# Patient Record
Sex: FEMALE | Race: WHITE | NOT HISPANIC OR LATINO | Employment: OTHER | URBAN - METROPOLITAN AREA
[De-identification: names, ages, dates, MRNs, and addresses within clinical notes are randomized per-mention and may not be internally consistent; named-entity substitution may affect disease eponyms.]

---

## 2017-05-09 DIAGNOSIS — R03.0 ELEVATED BLOOD PRESSURE READING WITHOUT DIAGNOSIS OF HYPERTENSION: Primary | ICD-10-CM

## 2017-05-09 LAB
CHOLESTEROL (EXTERNAL): 177 MG/DL (ref 100–199)
CREATININE (EXTERNAL): 0.74 MG/DL (ref 0.57–1)
GFR ESTIMATED (EXTERNAL): 80 ML/MIN/1.73
GFR ESTIMATED (IF AFRICAN AMERICAN) (EXTERNAL): 92 ML/MIN/1.73
GLUCOSE (EXTERNAL): 101 MG/DL (ref 65–99)
HDLC SERPL-MCNC: 52 MG/DL
LDL CHOLESTEROL CALCULATED (EXTERNAL): 109 MG/DL (ref 0–99)
POTASSIUM (EXTERNAL): 4.6 MMOL/L (ref 3.5–5.2)
TRIGLYCERIDES (EXTERNAL): 78 MG/DL (ref 0–149)
TSH SERPL-ACNC: 5.89 ULU/ML (ref 0.45–4.5)

## 2017-06-20 ENCOUNTER — HOSPITAL ENCOUNTER (OUTPATIENT)
Dept: CARDIOLOGY | Facility: CLINIC | Age: 77
Discharge: HOME | End: 2017-06-20
Attending: FAMILY MEDICINE | Admitting: FAMILY MEDICINE
Payer: MEDICARE

## 2017-06-20 DIAGNOSIS — R03.0 ELEVATED BLOOD PRESSURE READING WITHOUT DIAGNOSIS OF HYPERTENSION: ICD-10-CM

## 2017-06-20 PROCEDURE — 93788 AMBL BP MNTR W/SW A/R: CPT | Performed by: FAMILY MEDICINE

## 2017-06-20 PROCEDURE — 93786 AMBL BP MNTR W/SW REC ONLY: CPT | Performed by: FAMILY MEDICINE

## 2017-06-20 PROCEDURE — 93790 AMBL BP MNTR W/SW I&R: CPT | Performed by: INTERNAL MEDICINE

## 2017-06-26 ENCOUNTER — HOSPITAL ENCOUNTER (OUTPATIENT)
Dept: MAMMOGRAPHY | Facility: CLINIC | Age: 77
Discharge: HOME | End: 2017-06-26
Attending: FAMILY MEDICINE | Admitting: FAMILY MEDICINE
Payer: MEDICARE

## 2017-06-26 DIAGNOSIS — Z12.31 ENCOUNTER FOR SCREENING MAMMOGRAM FOR HIGH-RISK PATIENT: ICD-10-CM

## 2017-06-26 PROCEDURE — 77063 BREAST TOMOSYNTHESIS BI: CPT

## 2017-08-13 ENCOUNTER — OFFICE VISIT (OUTPATIENT)
Dept: URGENT CARE | Facility: URGENT CARE | Age: 77
End: 2017-08-13
Payer: COMMERCIAL

## 2017-08-13 VITALS
WEIGHT: 160 LBS | HEART RATE: 57 BPM | DIASTOLIC BLOOD PRESSURE: 95 MMHG | SYSTOLIC BLOOD PRESSURE: 159 MMHG | TEMPERATURE: 97.6 F

## 2017-08-13 DIAGNOSIS — S61.213A LACERATION OF LEFT MIDDLE FINGER, FOREIGN BODY PRESENCE UNSPECIFIED, NAIL DAMAGE STATUS UNSPECIFIED, INITIAL ENCOUNTER: Primary | ICD-10-CM

## 2017-08-13 PROCEDURE — 12001 RPR S/N/AX/GEN/TRNK 2.5CM/<: CPT | Performed by: FAMILY MEDICINE

## 2017-08-13 RX ORDER — AMLODIPINE BESYLATE 5 MG/1
5 TABLET ORAL DAILY
COMMUNITY
End: 2021-08-04

## 2017-08-13 RX ORDER — LEVOTHYROXINE SODIUM 88 UG/1
88 TABLET ORAL DAILY
COMMUNITY
End: 2021-08-04

## 2017-08-13 NOTE — MR AVS SNAPSHOT
"              After Visit Summary   2017    Marah Mccormick    MRN: [id number]           Patient Information     Date Of Birth          1940        Visit Information        Provider Department      2017 4:50 PM Elias Garcia,  [facility]        Today's Diagnoses     Laceration of left middle finger, foreign body presence unspecified, nail damage status unspecified, initial encounter    -  1       Follow-ups after your visit        Who to contact     If you have questions or need follow up information about today's clinic visit or your schedule please contact [facility] directly at [phone].  Normal or non-critical lab and imaging results will be communicated to you by MyChart, letter or phone within 4 business days after the clinic has received the results. If you do not hear from us within 7 days, please contact the clinic through MyChart or phone. If you have a critical or abnormal lab result, we will notify you by phone as soon as possible.  Submit refill requests through MOVE Guides or call your pharmacy and they will forward the refill request to us. Please allow 3 business days for your refill to be completed.          Additional Information About Your Visit        MyChart Information     MOVE Guides lets you send messages to your doctor, view your test results, renew your prescriptions, schedule appointments and more. To sign up, go to [email or web address] . Click on \"Log in\" on the left side of the screen, which will take you to the Welcome page. Then click on \"Sign up Now\" on the right side of the page.     You will be asked to enter the access code listed below, as well as some personal information. Please follow the directions to create your username and password.     Your access code is: [email or web address]  Expires: 2017  5:25 PM     Your access code will  in 90 days. If you need help or a new code, please call your " [facility] or [phone].        Care EveryWhere ID     This is your Care EveryWhere ID. This could be used by other organizations to access your [place] medical records  [id number]        Your Vitals Were     Pulse Temperature                57 97.6  F (36.4  C) (Oral)           Blood Pressure from Last 3 Encounters:   08/13/17 (!) 159/95    Weight from Last 3 Encounters:   08/13/17 160 lb (72.6 kg)              We Performed the Following     REPAIR SUPERFICIAL, WOUND BODY < =2.5CM        Primary Care Provider Office Phone # Fax #    Sudha Eleni Austin [phone]       [facility] [address]  [place] [address]        Equal Access to Services     RIMA PERALTA : Hadii aad ku hadasho Soomaali, waaxda luqadaha, qaybta kaalmada adeegyada, waxay idiin haybaldon anshu sifuentes . So [facility] [phone].    ATENCIÓN: Si habla español, tiene a jha disposición servicios gratuitos de asistencia lingüística. Llame al [phone].    We comply with applicable federal civil rights laws and [place] laws. We do not discriminate on the basis of race, color, national origin, age, disability sex, sexual orientation or gender identity.            Thank you!     Thank you for choosing [facility]  for your care. Our goal is always to provide you with excellent care. Hearing back from our patients is one way we can continue to improve our services. Please take a few minutes to complete the written survey that you may receive in the mail after your visit with us. Thank you!             Your Updated Medication List - Protect others around you: Learn how to safely use, store and throw away your medicines at www.disposemymeds.org.          This list is accurate as of: 8/13/17  5:25 PM.  Always use your most recent med list.                   Brand Name Dispense Instructions for use Diagnosis    amLODIPine 5 MG tablet    NORVASC     Take 5 mg by mouth daily         levothyroxine 88 MCG tablet    SYNTHROID/LEVOTHROID     Take 88 mcg by mouth daily

## 2017-08-13 NOTE — NURSING NOTE
Chief Complaint   Patient presents with     Laceration     laceration of left 3rd finger from knife. Pt states her last Td was maybe 7yrs ago.       Initial BP (!) 159/95  Pulse 57  Temp 97.6  F (36.4  C) (Oral)  Wt 160 lb (72.6 kg) There is no height or weight on file to calculate BMI.  Medication Reconciliation: complete

## 2017-08-13 NOTE — PROGRESS NOTES
SUBJECTIVE: @[email or web address]@.ident who presents to the clinic with a laceration on the finger sustained 2 hours(s) ago.    This is a accidental injury.    Mechanism of injury: knife.  Associated symptoms: Denies numbness, weakness, or loss of function  Last tetanus booster within 10 years: yes    No past medical history on file.  No Known Allergies  Social History   Substance Use Topics     Smoking status: Never Smoker     Smokeless tobacco: Never Used     Alcohol use Not on file       ROS:  Neuro: good distal sensation  Motor: normal rom and strenght  Hem: capillary refill < 2 sec    EXAM: The patient appears today in alert,no apparent distress distressVITALS:   BP (!) 159/95  Pulse 57  Temp 97.6  F (36.4  C) (Oral)  Wt 160 lb (72.6 kg)  Size of laceration: 2 centimeters  Characteristics of the laceration: clean and straight  Tendon function intact: yes  Sensation to light touch intact: yes  Pulses/Capillary refill intact: yes      ICD-10-CM    1. Laceration of left middle finger, foreign body presence unspecified, nail damage status unspecified, initial encounter S61.213A REPAIR SUPERFICIAL, WOUND BODY < =2.5CM       Procedure Note:Wound injected with 1 cc's of Lidocaine 1% plain  Good anesthesia was obtainedPrepped and draped in the usual sterile fashion  Wound cleaned with sterile water  Wound soakedLaceration was closed using 3 5-0 nylon interrupted sutures  After care instructions:Keep wound clean   Sutures out in 10 days  Signs of infection discussed today

## 2017-08-23 ENCOUNTER — OFFICE VISIT (OUTPATIENT)
Dept: URGENT CARE | Facility: URGENT CARE | Age: 77
End: 2017-08-23
Payer: COMMERCIAL

## 2017-08-23 DIAGNOSIS — Z48.89 SUTURE CHECK: Primary | ICD-10-CM

## 2017-08-23 PROCEDURE — 99024 POSTOP FOLLOW-UP VISIT: CPT

## 2017-08-23 NOTE — NURSING NOTE
Patient was advise to return in 4 days from today per Dr. Emi Arriaza due to laceration on the left hand middle finger distal joint. Smiley Resendez MA

## 2017-08-23 NOTE — PROGRESS NOTES
Sutures over DIP of finger, 3 sutures.  In for 10 days, advised keeping in for 14 days    Emi DIMAS, PAJersonC

## 2017-08-23 NOTE — MR AVS SNAPSHOT
"              After Visit Summary   2017    Marah Mccormick    MRN: [id number]           Patient Information     Date Of Birth          1940        Visit Information        Provider Department      2017 11:30 AM Provider, Pradeep Lauren MD [facility]        Today's Diagnoses     Suture check    -  1       Follow-ups after your visit        Who to contact     If you have questions or need follow up information about today's clinic visit or your schedule please contact [facility] directly at [phone].  Normal or non-critical lab and imaging results will be communicated to you by DocLandinghart, letter or phone within 4 business days after the clinic has received the results. If you do not hear from us within 7 days, please contact the clinic through AppFogt or phone. If you have a critical or abnormal lab result, we will notify you by phone as soon as possible.  Submit refill requests through Zazoom or call your pharmacy and they will forward the refill request to us. Please allow 3 business days for your refill to be completed.          Additional Information About Your Visit        MyChart Information     Zazoom lets you send messages to your doctor, view your test results, renew your prescriptions, schedule appointments and more. To sign up, go to [email or web address] . Click on \"Log in\" on the left side of the screen, which will take you to the Welcome page. Then click on \"Sign up Now\" on the right side of the page.     You will be asked to enter the access code listed below, as well as some personal information. Please follow the directions to create your username and password.     Your access code is: [email or web address]  Expires: 2017  5:25 PM     Your access code will  in 90 days. If you need help or a new code, please call your [place] clinic or [phone].        Care EveryWhere ID     This is your Care EveryWhere ID. This " could be used by other organizations to access your [place] medical records  [id number]         Blood Pressure from Last 3 Encounters:   08/13/17 (!) 159/95    Weight from Last 3 Encounters:   08/13/17 160 lb (72.6 kg)              Today, you had the following     No orders found for display       Primary Care Provider Office Phone # Fax #    Sudha Eleni Austin [phone]       [facility] [address]  [facility] [address]        Equal Access to Services     [facility]: Hadii aad ku hadasho Soomaali, waaxda luqadaha, qaybta kaalmada adeegyada, waxay idiin hayaan adeeg kharash lareynold . So [facility] [phone].    ATENCIÓN: Si habla español, tiene a jha disposición servicios gratuitos de asistencia lingüística. [facility] [phone].    We comply with applicable federal civil rights laws and [place] laws. We do not discriminate on the basis of race, color, national origin, age, disability sex, sexual orientation or gender identity.            Thank you!     Thank you for choosing [place] [facility]  for your care. Our goal is always to provide you with excellent care. Hearing back from our patients is one way we can continue to improve our services. Please take a few minutes to complete the written survey that you may receive in the mail after your visit with us. Thank you!             Your Updated Medication List - Protect others around you: Learn how to safely use, store and throw away your medicines at www.disposemymeds.org.          This list is accurate as of: 8/23/17  4:33 PM.  Always use your most recent med list.                   Brand Name Dispense Instructions for use Diagnosis    amLODIPine 5 MG tablet    NORVASC     Take 5 mg by mouth daily        levothyroxine 88 MCG tablet    SYNTHROID/LEVOTHROID     Take 88 mcg by mouth daily

## 2017-08-26 ENCOUNTER — OFFICE VISIT (OUTPATIENT)
Dept: URGENT CARE | Facility: URGENT CARE | Age: 77
End: 2017-08-26
Payer: COMMERCIAL

## 2017-08-26 VITALS — TEMPERATURE: 97.5 F

## 2017-08-26 DIAGNOSIS — Z48.02 ENCOUNTER FOR REMOVAL OF SUTURES: Primary | ICD-10-CM

## 2017-08-26 PROCEDURE — 99207 ZZC NO CHARGE NURSE ONLY: CPT

## 2017-08-26 NOTE — NURSING NOTE
Chief Complaint   Patient presents with     Suture Removal       Initial Temp 97.5  F (36.4  C) (Oral) There is no height or weight on file to calculate BMI.  Medication Reconciliation: unable or not appropriate to perform   Marah presents to the clinic today for  removal of sutures.  The patient has had the sutures in place for 13 days.    There has been no history of infection or drainage.    O: 3 sutures are seen located on the lt index finger.  The wound is healing well with no signs of infection.    Tetanus status is up to date.    A: Suture removal.    P:  All sutures were easily removed today.  Routine wound care discussed.  The patient will follow up as needed.  Order to remove per Gokul Pham LPN

## 2017-08-26 NOTE — MR AVS SNAPSHOT
"              After Visit Summary   2017    Marah Mccormick    MRN: [id number]           Patient Information     Date Of Birth          1940        Visit Information        Provider Department      2017 9:20 AM Provider, Pradeep Lauren MD [facility] [place] [facility]        Today's Diagnoses     Encounter for removal of sutures    -  1       Follow-ups after your visit        Who to contact     If you have questions or need follow up information about today's clinic visit or your schedule please contact [facility] directly at [phone].  Normal or non-critical lab and imaging results will be communicated to you by Navidoghart, letter or phone within 4 business days after the clinic has received the results. If you do not hear from us within 7 days, please contact the clinic through Navidoghart or phone. If you have a critical or abnormal lab result, we will notify you by phone as soon as possible.  Submit refill requests through JustParts or call your pharmacy and they will forward the refill request to us. Please allow 3 business days for your refill to be completed.          Additional Information About Your Visit        MyChart Information     JustParts lets you send messages to your doctor, view your test results, renew your prescriptions, schedule appointments and more. To sign up, go to [email or web address] . Click on \"Log in\" on the left side of the screen, which will take you to the Welcome page. Then click on \"Sign up Now\" on the right side of the page.     You will be asked to enter the access code listed below, as well as some personal information. Please follow the directions to create your username and password.     Your access code is: [email or web address]  Expires: 2017  5:25 PM     Your access code will  in 90 days. If you need help or a new code, please call your [place] clinic or [phone].        Care EveryWhere ID     This is your Care " EveryWhere ID. This could be used by other organizations to access your [place] medical records  [id number]        Your Vitals Were     Temperature                   97.5  F (36.4  C) (Oral)            Blood Pressure from Last 3 Encounters:   08/13/17 (!) 159/95    Weight from Last 3 Encounters:   08/13/17 160 lb (72.6 kg)              Today, you had the following     No orders found for display       Primary Care Provider Office Phone # Fax #    Sudha Eleni Austin [phone]       [facility] [address]  [place] [address]        Equal Access to Services     [facility]: Hadii aad ku hadasho Soomaali, waaxda luqadaha, qaybta kaalmada adeegyada, waxparris mahoney haybaldon adeeddie sifuentes . So [facility] [phone].    ATENCIÓN: Si habla español, tiene a jha disposición servicios gratuitos de asistencia lingüística. Llame al [phone].    We comply with applicable federal civil rights laws and [place] laws. We do not discriminate on the basis of race, color, national origin, age, disability sex, sexual orientation or gender identity.            Thank you!     Thank you for choosing [place] [facility]  for your care. Our goal is always to provide you with excellent care. Hearing back from our patients is one way we can continue to improve our services. Please take a few minutes to complete the written survey that you may receive in the mail after your visit with us. Thank you!             Your Updated Medication List - Protect others around you: Learn how to safely use, store and throw away your medicines at www.disposemymeds.org.          This list is accurate as of: 8/26/17 10:51 AM.  Always use your most recent med list.                   Brand Name Dispense Instructions for use Diagnosis    amLODIPine 5 MG tablet    NORVASC     Take 5 mg by mouth daily        levothyroxine 88 MCG tablet    SYNTHROID/LEVOTHROID     Take 88 mcg by mouth daily

## 2017-09-20 ENCOUNTER — APPOINTMENT (OUTPATIENT)
Dept: VASCULAR SURGERY | Facility: CLINIC | Age: 77
End: 2017-09-20
Payer: COMMERCIAL

## 2017-09-20 PROCEDURE — A6533 GC STOCKING THIGHLNGTH 18-30: HCPCS | Performed by: SURGERY

## 2018-05-10 ENCOUNTER — TRANSFERRED RECORDS (OUTPATIENT)
Dept: HEALTH INFORMATION MANAGEMENT | Facility: CLINIC | Age: 78
End: 2018-05-10

## 2018-05-10 LAB
CHOLESTEROL (EXTERNAL): 188 MG/DL (ref 100–199)
CREATININE (EXTERNAL): 0.78 MG/DL (ref 0.57–1)
GFR ESTIMATED (EXTERNAL): 74 ML/MIN/1.73
GFR ESTIMATED (IF AFRICAN AMERICAN) (EXTERNAL): 85 ML/MIN/1.73
GLUCOSE (EXTERNAL): 94 MG/DL (ref 65–99)
HDLC SERPL-MCNC: 56 MG/DL
LDL CHOLESTEROL CALCULATED (EXTERNAL): 118 MG/DL (ref 0–99)
POTASSIUM (EXTERNAL): 4.5 MMOL/L (ref 3.5–5.2)
TRIGLYCERIDES (EXTERNAL): 72 MG/DL (ref 0–149)
TSH SERPL-ACNC: 3.44 ULU/ML (ref 0.45–4.5)

## 2018-06-27 ENCOUNTER — HOSPITAL ENCOUNTER (OUTPATIENT)
Dept: MAMMOGRAPHY | Facility: CLINIC | Age: 78
Discharge: HOME | End: 2018-06-27
Attending: FAMILY MEDICINE | Admitting: FAMILY MEDICINE
Payer: MEDICARE

## 2018-06-27 DIAGNOSIS — Z12.31 ENCOUNTER FOR SCREENING MAMMOGRAM FOR HIGH-RISK PATIENT: ICD-10-CM

## 2018-06-27 PROCEDURE — 77063 BREAST TOMOSYNTHESIS BI: CPT

## 2018-11-06 ENCOUNTER — TRANSFERRED RECORDS (OUTPATIENT)
Dept: HEALTH INFORMATION MANAGEMENT | Facility: CLINIC | Age: 78
End: 2018-11-06

## 2019-05-01 LAB — PAP SMEAR - HIM PATIENT REPORTED: NEGATIVE

## 2019-07-01 ENCOUNTER — HOSPITAL ENCOUNTER (OUTPATIENT)
Dept: MAMMOGRAPHY | Facility: CLINIC | Age: 79
Discharge: HOME | End: 2019-07-01
Attending: FAMILY MEDICINE | Admitting: FAMILY MEDICINE
Payer: MEDICARE

## 2019-07-01 DIAGNOSIS — Z12.31 VISIT FOR SCREENING MAMMOGRAM: ICD-10-CM

## 2019-07-01 PROCEDURE — 77063 BREAST TOMOSYNTHESIS BI: CPT

## 2019-07-03 ENCOUNTER — TRANSFERRED RECORDS (OUTPATIENT)
Dept: HEALTH INFORMATION MANAGEMENT | Facility: CLINIC | Age: 79
End: 2019-07-03

## 2019-07-03 LAB
CHOLESTEROL (EXTERNAL): 176 MG/DL (ref 100–199)
CREATININE (EXTERNAL): 0.83 MG/DL (ref 0.57–1)
GFR ESTIMATED (EXTERNAL): 68 ML/MIN/1.73
GFR ESTIMATED (IF AFRICAN AMERICAN) (EXTERNAL): 79 ML/MIN/1.73
GLUCOSE (EXTERNAL): 86 MG/DL (ref 65–99)
HDLC SERPL-MCNC: 55 MG/DL
LDL CHOLESTEROL (EXTERNAL): 109 MG/DL (ref 0–99)
POTASSIUM (EXTERNAL): 4.6 MMOL/L (ref 3.5–5.2)
TRIGLYCERIDES (EXTERNAL): 62 MG/DL (ref 0–149)
TSH SERPL-ACNC: 2.95 ULU/ML (ref 0.45–4.5)

## 2019-12-16 ENCOUNTER — TRANSFERRED RECORDS (OUTPATIENT)
Dept: HEALTH INFORMATION MANAGEMENT | Facility: CLINIC | Age: 79
End: 2019-12-16

## 2019-12-16 LAB
CREATININE (EXTERNAL): 0.71 MG/DL (ref 0.57–1)
GFR ESTIMATED (EXTERNAL): 82 ML/MIN/1.73
GFR ESTIMATED (IF AFRICAN AMERICAN) (EXTERNAL): 95 ML/MIN/1.73
GLUCOSE (EXTERNAL): 65 MG/DL (ref 65–99)
POTASSIUM (EXTERNAL): 4.8 MMOL/L (ref 3.5–5.2)

## 2020-07-16 ENCOUNTER — TRANSFERRED RECORDS (OUTPATIENT)
Dept: HEALTH INFORMATION MANAGEMENT | Facility: CLINIC | Age: 80
End: 2020-07-16

## 2020-07-16 LAB
ALT SERPL-CCNC: 17 U/L (ref 6–29)
AST SERPL-CCNC: 17 U/L (ref 10–35)
CHOLESTEROL (EXTERNAL): 182 MG/DL
CREATININE (EXTERNAL): 0.8 MG/DL (ref 0.6–0.93)
GFR ESTIMATED (EXTERNAL): 71 ML/MIN/1.73M2
GFR ESTIMATED (IF AFRICAN AMERICAN) (EXTERNAL): 82 ML/MIN/1.73M2
GLUCOSE (EXTERNAL): 98 MG/DL (ref 65–99)
HBA1C MFR BLD: 5.1 % (ref 4–6)
HDLC SERPL-MCNC: 59 MG/DL
HEP C HIM: NORMAL
LDL CHOLESTEROL (EXTERNAL): 108 MG/DL
NON HDL CHOLESTEROL (EXTERNAL): 123 MG/DL
POTASSIUM (EXTERNAL): 4.2 MMOL/L (ref 3.5–5.3)
TRIGLYCERIDES (EXTERNAL): 65 MG/DL
TSH SERPL-ACNC: 1.75 MIU/L (ref 0.4–4.5)

## 2020-07-21 ENCOUNTER — HOSPITAL ENCOUNTER (OUTPATIENT)
Dept: MAMMOGRAPHY | Facility: CLINIC | Age: 80
Discharge: HOME | End: 2020-07-21
Attending: FAMILY MEDICINE | Admitting: FAMILY MEDICINE
Payer: MEDICARE

## 2020-07-21 DIAGNOSIS — Z12.31 VISIT FOR SCREENING MAMMOGRAM: ICD-10-CM

## 2020-07-21 PROCEDURE — 77067 SCR MAMMO BI INCL CAD: CPT

## 2020-09-08 ENCOUNTER — TRANSFERRED RECORDS (OUTPATIENT)
Dept: HEALTH INFORMATION MANAGEMENT | Facility: CLINIC | Age: 80
End: 2020-09-08

## 2020-09-08 LAB
ALT SERPL-CCNC: 21 U/L (ref 6–29)
AST SERPL-CCNC: 17 U/L (ref 10–35)
CHOLESTEROL (EXTERNAL): 132 MG/DL
HDLC SERPL-MCNC: 58 MG/DL
LDL CHOLESTEROL (EXTERNAL): 61 MG/DL
NON HDL CHOLESTEROL (EXTERNAL): 74 MG/DL
TRIGLYCERIDES (EXTERNAL): 47 MG/DL

## 2021-02-03 ENCOUNTER — IMMUNIZATION (OUTPATIENT)
Dept: NURSING | Facility: CLINIC | Age: 81
End: 2021-02-03
Payer: MEDICARE

## 2021-02-03 PROCEDURE — 91300 PR COVID VAC PFIZER DIL RECON 30 MCG/0.3 ML IM: CPT

## 2021-02-03 PROCEDURE — 0001A PR COVID VAC PFIZER DIL RECON 30 MCG/0.3 ML IM: CPT

## 2021-02-06 ENCOUNTER — HEALTH MAINTENANCE LETTER (OUTPATIENT)
Age: 81
End: 2021-02-06

## 2021-02-24 ENCOUNTER — IMMUNIZATION (OUTPATIENT)
Dept: NURSING | Facility: CLINIC | Age: 81
End: 2021-02-24
Attending: FAMILY MEDICINE
Payer: MEDICARE

## 2021-02-24 PROCEDURE — 91300 PR COVID VAC PFIZER DIL RECON 30 MCG/0.3 ML IM: CPT

## 2021-02-24 PROCEDURE — 0002A PR COVID VAC PFIZER DIL RECON 30 MCG/0.3 ML IM: CPT

## 2021-05-05 ENCOUNTER — TELEPHONE (OUTPATIENT)
Dept: FAMILY MEDICINE | Facility: CLINIC | Age: 81
End: 2021-05-05

## 2021-05-05 NOTE — TELEPHONE ENCOUNTER
Reason for Call:  Other appointment    Detailed comments: Pt would like to establish care with Dr High. Her son and SO both see Dr High.    Phone Number Patient can be reached at: Home number on file [phone] (home)    Best Time: any    Can we leave a detailed message on this number? YES    Call taken on 5/5/2021 at 10:39 AM by Arturo Wilson

## 2021-07-26 ENCOUNTER — HOSPITAL ENCOUNTER (OUTPATIENT)
Dept: MAMMOGRAPHY | Facility: CLINIC | Age: 81
Discharge: HOME | End: 2021-07-26
Attending: INTERNAL MEDICINE | Admitting: INTERNAL MEDICINE
Payer: MEDICARE

## 2021-07-26 DIAGNOSIS — Z12.31 VISIT FOR SCREENING MAMMOGRAM: ICD-10-CM

## 2021-07-26 PROCEDURE — 77063 BREAST TOMOSYNTHESIS BI: CPT

## 2021-08-04 ENCOUNTER — OFFICE VISIT (OUTPATIENT)
Dept: FAMILY MEDICINE | Facility: CLINIC | Age: 81
End: 2021-08-04
Payer: MEDICARE

## 2021-08-04 VITALS
HEART RATE: 59 BPM | HEIGHT: 65 IN | SYSTOLIC BLOOD PRESSURE: 123 MMHG | BODY MASS INDEX: 27.66 KG/M2 | WEIGHT: 166 LBS | DIASTOLIC BLOOD PRESSURE: 81 MMHG | TEMPERATURE: 96.8 F | OXYGEN SATURATION: 99 %

## 2021-08-04 DIAGNOSIS — Z13.220 SCREENING FOR HYPERLIPIDEMIA: ICD-10-CM

## 2021-08-04 DIAGNOSIS — E78.5 HYPERLIPIDEMIA LDL GOAL <100: ICD-10-CM

## 2021-08-04 DIAGNOSIS — Z00.00 ENCOUNTER FOR MEDICARE ANNUAL WELLNESS EXAM: Primary | ICD-10-CM

## 2021-08-04 DIAGNOSIS — E03.9 HYPOTHYROIDISM, UNSPECIFIED TYPE: ICD-10-CM

## 2021-08-04 PROCEDURE — 36415 COLL VENOUS BLD VENIPUNCTURE: CPT | Performed by: INTERNAL MEDICINE

## 2021-08-04 PROCEDURE — 84443 ASSAY THYROID STIM HORMONE: CPT | Performed by: INTERNAL MEDICINE

## 2021-08-04 PROCEDURE — G0439 PPPS, SUBSEQ VISIT: HCPCS | Performed by: INTERNAL MEDICINE

## 2021-08-04 PROCEDURE — 80061 LIPID PANEL: CPT | Performed by: INTERNAL MEDICINE

## 2021-08-04 RX ORDER — LEVOTHYROXINE SODIUM 88 UG/1
88 TABLET ORAL DAILY
Qty: 90 TABLET | Refills: 3 | Status: SHIPPED | OUTPATIENT
Start: 2021-08-04 | End: 2022-06-15

## 2021-08-04 RX ORDER — VALSARTAN 80 MG/1
80 TABLET ORAL DAILY
Qty: 90 TABLET | Refills: 3 | Status: SHIPPED | OUTPATIENT
Start: 2021-08-04 | End: 2022-03-25

## 2021-08-04 RX ORDER — ATORVASTATIN CALCIUM 10 MG/1
10 TABLET, FILM COATED ORAL DAILY
COMMUNITY
Start: 2021-05-19 | End: 2021-08-04

## 2021-08-04 RX ORDER — ATORVASTATIN CALCIUM 10 MG/1
10 TABLET, FILM COATED ORAL DAILY
Qty: 90 TABLET | Refills: 3 | Status: SHIPPED | OUTPATIENT
Start: 2021-08-04 | End: 2022-07-21

## 2021-08-04 RX ORDER — VALSARTAN 80 MG/1
80 TABLET ORAL DAILY
COMMUNITY
Start: 2021-05-19 | End: 2021-08-04

## 2021-08-04 ASSESSMENT — ENCOUNTER SYMPTOMS
ABDOMINAL PAIN: 0
EYE PAIN: 0
CONSTIPATION: 0
HEARTBURN: 0
CHILLS: 0
ARTHRALGIAS: 0
JOINT SWELLING: 0
FEVER: 0
PARESTHESIAS: 0
HEMATOCHEZIA: 0
SORE THROAT: 0
NERVOUS/ANXIOUS: 0
HEADACHES: 0
NAUSEA: 0
PALPITATIONS: 0
BREAST MASS: 0
COUGH: 0
FREQUENCY: 0
DIARRHEA: 0
DYSURIA: 0
DIZZINESS: 0
HEMATURIA: 0
MYALGIAS: 0
WEAKNESS: 0
SHORTNESS OF BREATH: 0

## 2021-08-04 ASSESSMENT — MIFFLIN-ST. JEOR: SCORE: 1224.08

## 2021-08-04 ASSESSMENT — ACTIVITIES OF DAILY LIVING (ADL): CURRENT_FUNCTION: NO ASSISTANCE NEEDED

## 2021-08-04 NOTE — PATIENT INSTRUCTIONS
Patient Education   Personalized Prevention Plan  You are due for the preventive services outlined below.  Your care team is available to assist you in scheduling these services.  If you have already completed any of these items, please share that information with your care team to update in your medical record.  Health Maintenance Due   Topic Date Due     Osteoporosis Screening  Never done     Hepatitis C Screening  Never done     Cholesterol Lab  Never done     FALL RISK ASSESSMENT  Never done       Urinary Incontinence, Female (Adult)   Urinary incontinence means loss of bladder control. This problem affects many women, especially as they get older. If you have incontinence, you may be embarrassed to ask for help. But know that this problem can be treated.   Types of Incontinence  There are different types of incontinence. Two of the main types are described here. You can have more than one type.     Stress incontinence. With this type, urine leaks when pressure (stress) is put on the bladder. This may happen when you cough, sneeze, or laugh. Stress incontinence most often occurs because the pelvic floor muscles that support the bladder and urethra are weak. This can happen after pregnancy and vaginal childbirth or a hysterectomy. It can also be due to excess body weight or hormone changes.    Urge incontinence (also called overactive bladder). With this type, a sudden urge to urinate is felt often. This may happen even though there may not be much urine in the bladder. The need to urinate often during the night is common. Urge incontinence most often occurs because of bladder spasms. This may be due to bladder irritation or infection. Damage to bladder nerves or pelvic muscles, constipation, and certain medicines can also lead to urge incontinence.  Treatment depends on the cause. Further evaluation is needed to find the type you have. This will likely include an exam and certain tests. Based on the results,  you and your healthcare provider can then plan treatment. Until a diagnosis is made, the home care tips below can help ease symptoms.   Home care    Do pelvic floor muscle exercises, if they are prescribed. The pelvic floor muscles help support the bladder and urethra. Many women find that their symptoms improve when doing special exercises that strengthen these muscles. To do the exercises, contract the muscles you would use to stop your stream of urine. But do this when you re not urinating. Hold for 10 seconds, then relax. Repeat 10 to 20 times in a row, at least 3 times a day. Your healthcare provider may give you other instructions for how to do the exercises and how often.    Keep a bladder diary. This helps track how often and how much you urinate over a set period of time. Bring this diary with you to your next visit with the provider. The information can help your provider learn more about your bladder problem.    Lose weight, if advised to by your provider. Extra weight puts pressure on the bladder. Your provider can help you create a weight-loss plan that s right for you. This may include exercising more and making certain diet changes.    Don't have foods and drinks that may irritate the bladder. These can include alcohol and caffeinated drinks.    Quit smoking. Smoking and other tobacco use can lead to a long-term (chronic) cough that strains the pelvic floor muscles. Smoking may also damage the bladder and urethra. Talk with your provider about treatments or methods you can use to quit smoking.    If drinking large amounts of fluid makes you have symptoms, you may be advised to limit your fluid intake. You may also be advised to drink most of your fluids during the day and to limit fluids at night.    If you re worried about urine leakage or accidents, you may wear absorbent pads to catch urine. Change the pads often. This helps reduce discomfort. It may also reduce the risk of skin or bladder  infections.    Follow-up care  Follow up with your healthcare provider, or as directed. It may take some to find the right treatment for your problem. But healthy lifestyle changes can be made right away. These include such things as exercising on a regular basis, eating a healthy diet, losing weight (if needed), and quitting smoking. Your treatment plan may include special therapies or medicines. Certain procedures or surgery may also be options. Talk about any questions you have with your provider.   When to seek medical advice  Call the healthcare provider right away if any of these occur:    Fever of 100.4 F (38 C) or higher, or as directed by your provider    Bladder pain or fullness    Belly swelling    Nausea or vomiting    Back pain    Weakness, dizziness, or fainting  Mary last reviewed this educational content on 1/1/2020 2000-2021 [employer]. All rights reserved. This information is not intended as a substitute for professional medical care. Always follow your healthcare professional's instructions.

## 2021-08-04 NOTE — PROGRESS NOTES
"SUBJECTIVE:   Marah Mccormick is a 81 year old female who presents for Preventive Visit.    Patient has been advised of split billing requirements and indicates understanding: Yes   Are you in the first 12 months of your Medicare coverage?  No    Healthy Habits:     In general, how would you rate your overall health?  Good    Frequency of exercise:  4-5 days/week    Duration of exercise:  15-30 minutes    Do you usually eat at least 4 servings of fruit and vegetables a day, include whole grains    & fiber and avoid regularly eating high fat or \"junk\" foods?  Yes    Taking medications regularly:  Yes    Barriers to taking medications:  None    Medication side effects:  None    Ability to successfully perform activities of daily living:  No assistance needed    Home Safety:  No safety concerns identified    Hearing Impairment:  No hearing concerns    In the past 6 months, have you been bothered by leaking of urine? Yes    In general, how would you rate your overall mental or emotional health?  Excellent      PHQ-2 Total Score: 0    Additional concerns today:  No    Do you feel safe in your environment? Yes    Have you ever done Advance Care Planning? (For example, a Health Directive, POLST, or a discussion with a medical provider or your loved ones about your wishes): Yes, patient states has an Advance Care Planning document and will bring a copy to the clinic.      Fall risk  Fallen 2 or more times in the past year?: No  Any fall with injury in the past year?: No    Cognitive Screening   1) Repeat 3 items (Leader, Season, Table)    2) Clock draw: ABNORMAL   3) 3 item recall: Recalls 2 objects   Results: ABNORMAL clock, 1-2 items recalled: PROBABLE COGNITIVE IMPAIRMENT, **INFORM PROVIDER**    Mini-CogTM Copyright JOE Cadet. Licensed by the author for use in [facility]; reprinted with permission (celina@.[facility]). All rights reserved.      Do you have sleep apnea, excessive snoring or daytime drowsiness?: " no    Reviewed and updated as needed this visit by clinical staff                 Reviewed and updated as needed this visit by Provider                Social History     Tobacco Use     Smoking status: Never Smoker     Smokeless tobacco: Never Used   Substance Use Topics     Alcohol use: Not on file     If you drink alcohol do you typically have >3 drinks per day or >7 drinks per week? No    No flowsheet data found.  AUDIT - Alcohol Use Disorders Identification Test - Reproduced from the World Health Organization Audit 2001 (Second Edition) 8/4/2021   1.  How often do you have a drink containing alcohol? Monthly or less   2.  How many drinks containing alcohol do you have on a typical day when you are drinking? 1 or 2   3.  How often do you have five or more drinks on one occasion? Never   4.  How often during the last year have you found that you were not able to stop drinking once you had started? Never   5.  How often during the last year have you failed to do what was normally expected of you because of drinking? Never   6.  How often during the last year have you needed a first drink in the morning to get yourself going after a heavy drinking session? Never   7.  How often during the last year have you had a feeling of guilt or remorse after drinking? Never   8.  How often during the last year have you been unable to remember what happened the night before because of your drinking? Never   9.  Have you or someone else been injured because of your drinking? No   10. Has a relative, friend, doctor or other health care worker been concerned about your drinking or suggested you cut down? No   TOTAL SCORE 1         Current providers sharing in care for this patient include:  Patient Care Team:  Arelis High MD as PCP - General (Internal Medicine)    The following health maintenance items are reviewed in Epic and correct as of today:  Health Maintenance Due   Topic Date Due     DEXA  Never done     ANNUAL  "REVIEW OF HM ORDERS  Never done     ADVANCE CARE PLANNING  Never done     HEPATITIS C SCREENING  Never done     DTAP/TDAP/TD IMMUNIZATION (1 - Tdap) Never done     LIPID  Never done     MEDICARE ANNUAL WELLNESS VISIT  Never done     FALL RISK ASSESSMENT  Never done     PHQ-2  Never done     Lab work is in process      Pertinent mammograms are reviewed under the imaging tab.    Review of Systems  Constitutional, HEENT, cardiovascular, pulmonary, GI, , musculoskeletal, neuro, skin, endocrine and psych systems are negative, except as otherwise noted.    OBJECTIVE:   /81 (BP Location: Right arm, Patient Position: Sitting, Cuff Size: Adult Regular)   Pulse 59   Temp 96.8  F (36  C) (Temporal)   Ht 1.643 m (5' 4.7\")   Wt 75.3 kg (166 lb)   SpO2 99%   BMI 27.88 kg/m   There is no height or weight on file to calculate BMI.  Physical Exam  GENERAL: alert and no distress  EYES: Eyes grossly normal to inspection, PERRL and conjunctivae and sclerae normal  HENT: ear canals and TM's normal, nose and mouth without ulcers or lesions  NECK: no adenopathy, no asymmetry, masses, or scars and thyroid normal to palpation  RESP: lungs clear to auscultation - no rales, rhonchi or wheezes  CV: regular rate and rhythm, normal S1 S2, no S3 or S4, no murmur, click or rub, no peripheral edema and peripheral pulses strong  ABDOMEN: soft, nontender, no hepatosplenomegaly, no masses and bowel sounds normal  MS: no gross musculoskeletal defects noted, no edema  SKIN: no suspicious lesions or rashes  NEURO: Normal strength and tone, mentation intact and speech normal  PSYCH: mentation appears normal, affect normal/bright    Diagnostic Test Results:  Labs reviewed in Epic    ASSESSMENT / PLAN:   (Z00.00) Encounter for Medicare annual wellness exam  (primary encounter diagnosis)  (E78.5) Hyperlipidemia LDL goal <100  (E03.9) Hypothyroidism, unspecified type  Comment: stable  Plan: levothyroxine (SYNTHROID/LEVOTHROID) 88 MCG         " "tablet, Lipid panel reflex to direct LDL         Fasting        TSH with free T4 reflex  atorvastatin (LIPITOR) 10 MG tablet, valsartan         (DIOVAN) 80 MG tablet,       Patient has been advised of split billing requirements and indicates understanding: Yes  COUNSELING:  Reviewed preventive health counseling, as reflected in patient instructions  Special attention given to:       Regular exercise       Healthy diet/nutrition    Estimated body mass index is 27.88 kg/m  as calculated from the following:    Height as of this encounter: 1.643 m (5' 4.7\").    Weight as of this encounter: 75.3 kg (166 lb).    Weight management plan: Discussed healthy diet and exercise guidelines    She reports that she has never smoked. She has never used smokeless tobacco.      Appropriate preventive services were discussed with this patient, including applicable screening as appropriate for cardiovascular disease, diabetes, osteopenia/osteoporosis, and glaucoma.  As appropriate for age/gender, discussed screening for colorectal cancer, prostate cancer, breast cancer, and cervical cancer. Checklist reviewing preventive services available has been given to the patient.    Reviewed patients plan of care and provided an AVS. The Basic Care Plan (routine screening as documented in Health Maintenance) for Marah meets the Care Plan requirement. This Care Plan has been established and reviewed with the Patient.    Counseling Resources:  ATP IV Guidelines  Pooled Cohorts Equation Calculator  Breast Cancer Risk Calculator  Breast Cancer: Medication to Reduce Risk  FRAX Risk Assessment  ICSI Preventive Guidelines  Dietary Guidelines for Americans, 2010  [employer]'s MyPlate  ASA Prophylaxis  Lung CA Screening    Arelis High MD  [facility]    Identified Health Risks:  "

## 2021-08-05 LAB
CHOLEST SERPL-MCNC: 124 MG/DL
FASTING STATUS PATIENT QL REPORTED: YES
HDLC SERPL-MCNC: 57 MG/DL
LDLC SERPL CALC-MCNC: 59 MG/DL
NONHDLC SERPL-MCNC: 67 MG/DL
TRIGL SERPL-MCNC: 39 MG/DL
TSH SERPL DL<=0.005 MIU/L-ACNC: 2.07 MU/L (ref 0.4–4)

## 2021-08-16 ENCOUNTER — TRANSFERRED RECORDS (OUTPATIENT)
Dept: HEALTH INFORMATION MANAGEMENT | Facility: CLINIC | Age: 81
End: 2021-08-16

## 2021-09-12 ENCOUNTER — HEALTH MAINTENANCE LETTER (OUTPATIENT)
Age: 81
End: 2021-09-12

## 2021-10-05 ENCOUNTER — MYC MEDICAL ADVICE (OUTPATIENT)
Dept: FAMILY MEDICINE | Facility: CLINIC | Age: 81
End: 2021-10-05

## 2021-10-06 NOTE — TELEPHONE ENCOUNTER
Patient calling back and asking if she can get both vaccines at the same visit. RN advised that per CDC both could be given at the same time. Routing to provider - are you preferring patient have COVID and Flu vaccine ?

## 2021-10-06 NOTE — TELEPHONE ENCOUNTER
Doctor Anila, pt had 08/04/2021 yearly exam. AVS reads- return in a year. Should pt schedule OV with you before? Otherwise, pt would be advised to schedule a nurse only visit.

## 2021-10-21 ENCOUNTER — OFFICE VISIT (OUTPATIENT)
Dept: FAMILY MEDICINE | Facility: CLINIC | Age: 81
End: 2021-10-21
Payer: MEDICARE

## 2021-10-21 VITALS
DIASTOLIC BLOOD PRESSURE: 86 MMHG | HEART RATE: 73 BPM | OXYGEN SATURATION: 97 % | TEMPERATURE: 97.7 F | HEIGHT: 65 IN | BODY MASS INDEX: 27.49 KG/M2 | RESPIRATION RATE: 16 BRPM | WEIGHT: 165 LBS | SYSTOLIC BLOOD PRESSURE: 140 MMHG

## 2021-10-21 DIAGNOSIS — D17.30 LIPOMA OF SKIN AND SUBCUTANEOUS TISSUE: Primary | ICD-10-CM

## 2021-10-21 DIAGNOSIS — E78.5 HYPERLIPIDEMIA LDL GOAL <100: ICD-10-CM

## 2021-10-21 DIAGNOSIS — Z23 HIGH PRIORITY FOR 2019-NCOV VACCINE: ICD-10-CM

## 2021-10-21 DIAGNOSIS — E03.9 HYPOTHYROIDISM, UNSPECIFIED TYPE: ICD-10-CM

## 2021-10-21 PROCEDURE — 99214 OFFICE O/P EST MOD 30 MIN: CPT | Mod: 25 | Performed by: INTERNAL MEDICINE

## 2021-10-21 PROCEDURE — 91300 COVID-19,PF,PFIZER (12+ YRS): CPT | Performed by: INTERNAL MEDICINE

## 2021-10-21 PROCEDURE — 0004A COVID-19,PF,PFIZER (12+ YRS): CPT | Performed by: INTERNAL MEDICINE

## 2021-10-21 ASSESSMENT — MIFFLIN-ST. JEOR: SCORE: 1219.55

## 2021-10-21 NOTE — PROGRESS NOTES
Subjective   Marah is a 81 year old who presents for the following health issues    HPI     Chief Complaint:       Follow up on multiple concerns including LLQ abdominal wall lipoma      HPI:   Patient Marah Mccormick is a very pleasant 81 year old female with history of hypertension, hyperlipidemia, hypothyroidism who presents to Internal Medicine clinic today for follow up on multiple concerns including LLQ abdominal wall lipoma. Regarding the patient's chronic LLQ abdominal lipomas vs ventral hernia, the patient is interested in a general surgery clinic evaluation at the [facility] Surgery clinic going forward. No chest pain, headaches, fever or chills.     Current Medications:     Current Outpatient Medications   Medication Sig Dispense Refill     atorvastatin (LIPITOR) 10 MG tablet Take 1 tablet (10 mg) by mouth daily Take 10 mg by mouth daily 90 tablet 3     levothyroxine (SYNTHROID/LEVOTHROID) 88 MCG tablet Take 1 tablet (88 mcg) by mouth daily 90 tablet 3     Multiple Vitamin (MULTI-VITAMIN DAILY PO) Take 1 Scoop by mouth daily       valsartan (DIOVAN) 80 MG tablet Take 1 tablet (80 mg) by mouth daily Take 80 mg by mouth daily 90 tablet 3         Allergies:      Allergies   Allergen Reactions     No Known Allergies             Past Medical History:     Past Medical History:   Diagnosis Date     Cancer (H) December 2019    Had melanoma & had surgery  on December 18 2119     Hypertension January 2015    Taking Valsartan 80 Mg 1 daily     Thyroid disease 2000    Taking Levothyroxine 88 mg 1 day in morning         Past Surgical History:     Past Surgical History:   Procedure Laterality Date     BIOPSY  2019    Melanoma     COLONOSCOPY  2019     VASCULAR SURGERY  October 2013    Varicose vein surgery         Family Medical History:     Family History   Problem Relation Age of Onset     Hypertension Brother      Hypertension Sister      Hyperlipidemia Sister      Other Cancer Sister         CLL      Substance Abuse Sister      Substance Abuse Father          1970     Substance Abuse Brother      Substance Abuse Daughter      Substance Abuse Son          Social History:     Social History     Socioeconomic History     Marital status:      Spouse name: Not on file     Number of children: Not on file     Years of education: Not on file     Highest education level: Not on file   Occupational History     Not on file   Tobacco Use     Smoking status: Former Smoker     Packs/day: 0.50     Years: 2.00     Pack years: 1.00     Types: Cigarettes     Start date: 1961     Quit date: 1963     Years since quittin.4     Smokeless tobacco: Never Used   Substance and Sexual Activity     Alcohol use: Yes     Comment: 1 glass of wine daily     Drug use: Never     Sexual activity: Not Currently     Partners: Male   Other Topics Concern     Parent/sibling w/ CABG, MI or angioplasty before 65F 55M? No   Social History Narrative     Not on file     Social Determinants of Health     Financial Resource Strain:      Difficulty of Paying Living Expenses:    Food Insecurity:      Worried About Running Out of Food in the Last Year:      Ran Out of Food in the Last Year:    Transportation Needs:      Lack of Transportation (Medical):      Lack of Transportation (Non-Medical):    Physical Activity:      Days of Exercise per Week:      Minutes of Exercise per Session:    Stress:      Feeling of Stress :    Social Connections:      Frequency of Communication with Friends and Family:      Frequency of Social Gatherings with Friends and Family:      Attends Jew Services:      Active Member of Clubs or Organizations:      Attends Club or Organization Meetings:      Marital Status:    Intimate Partner Violence:      Fear of Current or Ex-Partner:      Emotionally Abused:      Physically Abused:      Sexually Abused:            Review of System:     Constitutional: Negative for fever or chills  Skin: Negative for  "rashes  Ears/Nose/Throat: Negative for nasal congestion, sore throat  Respiratory: No shortness of breath, dyspnea on exertion, cough, or hemoptysis  Cardiovascular: Negative for chest pain  Gastrointestinal: Negative for nausea, vomiting, positive for LLQ abdominal lipomas vs ventral hernia  Genitourinary: Negative for dysuria, hematuria  Musculoskeletal: Negative for myalgias  Neurologic: Negative for headaches  Psychiatric: Negative for depression, anxiety  Hematologic/Lymphatic/Immunologic: Negative  Endocrine: Negative  Behavioral: Negative for tobacco use       Physical Exam:   BP (!) 140/86 (BP Location: Right arm, Patient Position: Sitting, Cuff Size: Adult Regular)   Pulse 73   Temp 97.7  F (36.5  C) (Temporal)   Resp 16   Ht 1.643 m (5' 4.7\")   Wt 74.8 kg (165 lb)   SpO2 97%   BMI 27.71 kg/m      GENERAL: alert and no distress  EYES: eyes grossly normal to inspection, and conjunctivae and sclerae normal  HENT: Normocephalic atraumatic. Nose and mouth without ulcers or lesions  NECK: supple  RESP: lungs clear to auscultation   CV: regular rate and rhythm, normal S1 S2  LYMPH: no peripheral edema   ABDOMEN: positive for LLQ abdominal lipomas vs ventral hernia  MS: no gross musculoskeletal defects noted  SKIN: no suspicious lesions or rashes  NEURO: Alert & Oriented x 3.   PSYCH: mentation appears normal, affect normal        Diagnostic Test Results:     Diagnostic Test Results:  Labs reviewed in Epic    ASSESSMENT/PLAN:     Marah was seen today for recheck and imm/inj.    Diagnoses and all orders for this visit:    Lipoma of skin and subcutaneous tissue  -     Adult General Surg Referral; Future  -     US Abdomen Limited; Future    High priority for 2019-nCoV vaccine  -     COVID-19,PF,PFIZER    Hyperlipidemia LDL goal <100  - stable, continue current therapy    Hypothyroidism, unspecified type  - stable, continue current therapy    Follow Up Plan:     Patient is instructed to return to Internal " Medicine clinic for follow-up visit in 1 month.        Arelis High MD  Internal Medicine  [facility]

## 2021-10-22 ENCOUNTER — HOSPITAL ENCOUNTER (OUTPATIENT)
Dept: ULTRASOUND IMAGING | Facility: CLINIC | Age: 81
Discharge: HOME | End: 2021-10-22
Attending: INTERNAL MEDICINE | Admitting: INTERNAL MEDICINE
Payer: MEDICARE

## 2021-10-22 DIAGNOSIS — D17.30 LIPOMA OF SKIN AND SUBCUTANEOUS TISSUE: ICD-10-CM

## 2021-10-22 PROBLEM — E03.9 HYPOTHYROIDISM, UNSPECIFIED TYPE: Status: ACTIVE | Noted: 2021-10-22

## 2021-10-22 PROBLEM — E78.5 HYPERLIPIDEMIA LDL GOAL <100: Status: ACTIVE | Noted: 2021-10-22

## 2021-10-22 PROCEDURE — 76705 ECHO EXAM OF ABDOMEN: CPT

## 2021-10-25 ENCOUNTER — OFFICE VISIT (OUTPATIENT)
Dept: SURGERY | Facility: CLINIC | Age: 81
End: 2021-10-25
Attending: INTERNAL MEDICINE
Payer: MEDICARE

## 2021-10-25 VITALS — SYSTOLIC BLOOD PRESSURE: 120 MMHG | DIASTOLIC BLOOD PRESSURE: 70 MMHG | HEART RATE: 54 BPM

## 2021-10-25 DIAGNOSIS — K43.9 SPIGELIAN HERNIA: ICD-10-CM

## 2021-10-25 DIAGNOSIS — D17.30 LIPOMA OF SKIN AND SUBCUTANEOUS TISSUE: ICD-10-CM

## 2021-10-25 PROCEDURE — 99203 OFFICE O/P NEW LOW 30 MIN: CPT | Performed by: SURGERY

## 2021-10-25 NOTE — PROGRESS NOTES
Surgery Consultation, [facility], JUAN PABLO Lujan MD, MD    Marah Mccormick MRN# [id number]   YOB: 1940 Age: 81 year old     PCP:  Arelis High [phone]    Chief Complaint: Spigelion hernia    Pt was seen in consultation from Arelis High.    History of Present Illness:  Marah Mccormick is a 81 year old female who presented with a bulge at the left lower abdomen.  Patient has noticed this over the past year but feels it has gotten larger.  Has never had significant pain associated with this.  She brought this to the attention of her primary care provider who felt that this was most likely consistent with either lipoma or hernia.  Ultrasound of the area was ordered and this revealed a 2 cm fascial defect lateral to the rectus muscle containing fat and bowel.  This was reducible and relatively nontender.  Patient has no history of surgery to the area.  Has never had obstructive type symptoms.  She presents for possible surgical discussion.    PMH:  Marah Mccormick  has a past medical history of Cancer (H) (December 2019), Hypertension (January 2015), and Thyroid disease (2000).  PSH:  Marah Mccormick  has a past surgical history that includes biopsy (2019); colonoscopy (2019); and vascular surgery (October 2013).    Home medications and allergies reviewed.    Social History:  Marah Mccormick  reports that she quit smoking about 58 years ago. Her smoking use included cigarettes. She started smoking about 60 years ago. She has a 1.00 pack-year smoking history. She has never used smokeless tobacco. She reports current alcohol use. She reports that she does not use drugs.  Family History:  Marah Mccormick family history includes Hyperlipidemia in her sister; Hypertension in her brother and sister; Other Cancer in her sister; Substance Abuse in her brother, daughter, father, sister, and son.    ROS:  The 10 point Review of Systems is negative other than noted in the  HPI.  No fevers or chills.  No recent weight loss or weight gain.    Physical Exam:  Blood pressure 120/70, pulse 54.  0 lbs 0 oz  Pleasant healthy-appearing female in no distress.  Patient has a pleasant affect and communicates well.   Pupils equal round and reactive to light.   No cervical lymphadenopathy or thyromegaly.   Lung fields clear, breathing comfortably.   Heart normal sinus rhythm.  No murmurs rubs or gallops.  Abdomen soft, nontender, nondistended.  Easily palpable mass in the left lateral abdomen.  Soft, reducible with some difficulty.  No tenderness.  Skin warm, dry.  No obvious rashes or lesions.    All new lab and imaging data was reviewed.  This includes personal review of the ultrasound images and report.     Assessment/plan: Pleasant 81-year-old female with a new diagnosis of spigelian hernia, gradually getting larger.  This is not demonstrated any evidence for incarceration but has gotten larger over time.  I explained that spiculated hernias can be somewhat more worrisome than traditional abdominal wall hernia and that incarceration and bowel entrapment is more likely to occur.  Patient was hoping to get this done in the next couple of months and asked if there was any urgency to it.  I recommended that she get this taken care of sometime in the near future but does not have to happen in the next several weeks or months.  I spent a significant amount of time describing the possible presentation of incarceration or obstruction and told her to present to the emergency department if any of these symptoms occur.  Otherwise, she was planning on calling the office in the next month or so to schedule surgery, likely in the new year.  This would be a robotic assisted ventral hernia repair with mesh.  I explained the possible risks of hernia recurrence, mesh infection, and she was interested in proceeding.  Surgical co-morbities include hypertension, hyperlipidemia, hypothyroidism.    Maykel Lujan,  M.D.  [facility]  [phone]    Please route or send letter to:  Primary Care Provider (PCP) and Referring Provider

## 2022-02-23 ENCOUNTER — TRANSFERRED RECORDS (OUTPATIENT)
Dept: HEALTH INFORMATION MANAGEMENT | Facility: CLINIC | Age: 82
End: 2022-02-23
Payer: MEDICARE

## 2022-03-24 ENCOUNTER — MYC MEDICAL ADVICE (OUTPATIENT)
Dept: FAMILY MEDICINE | Facility: CLINIC | Age: 82
End: 2022-03-24
Payer: MEDICARE

## 2022-03-24 DIAGNOSIS — E78.5 HYPERLIPIDEMIA LDL GOAL <100: ICD-10-CM

## 2022-03-25 RX ORDER — VALSARTAN 80 MG/1
80 TABLET ORAL DAILY
Qty: 90 TABLET | Refills: 1 | Status: SHIPPED | OUTPATIENT
Start: 2022-03-25 | End: 2022-11-01

## 2022-03-25 NOTE — TELEPHONE ENCOUNTER
Routing refill request to provider for review/approval because:  Drug not on the [facility] refill protocol .    Angiotensin-II Receptors Failed 03/25/2022 09:17 AM   Protocol Details  Normal serum creatinine on file in past 12 months    Normal serum potassium on file in past 12 months       Patient's medication was loose and spilled in her dirty suitcase. She is asking for more medication.     Sonali Nelson RN  [facility] Triage

## 2022-05-10 ENCOUNTER — MYC MEDICAL ADVICE (OUTPATIENT)
Dept: FAMILY MEDICINE | Facility: CLINIC | Age: 82
End: 2022-05-10
Payer: MEDICARE

## 2022-05-10 ENCOUNTER — IMMUNIZATION (OUTPATIENT)
Dept: NURSING | Facility: CLINIC | Age: 82
End: 2022-05-10
Payer: MEDICARE

## 2022-05-10 PROCEDURE — 0054A COVID-19,PF,PFIZER (12+ YRS): CPT

## 2022-05-10 PROCEDURE — 91305 COVID-19,PF,PFIZER (12+ YRS): CPT

## 2022-06-14 DIAGNOSIS — E03.9 HYPOTHYROIDISM, UNSPECIFIED TYPE: ICD-10-CM

## 2022-06-15 RX ORDER — LEVOTHYROXINE SODIUM 88 UG/1
TABLET ORAL
Qty: 90 TABLET | Refills: 0 | Status: SHIPPED | OUTPATIENT
Start: 2022-06-15 | End: 2022-11-01

## 2022-06-15 NOTE — TELEPHONE ENCOUNTER
Prescription approved per [facility] Refill Protocol.  Sonali Nelson RN  [facility]

## 2022-07-28 ENCOUNTER — TELEPHONE (OUTPATIENT)
Dept: FAMILY MEDICINE | Facility: CLINIC | Age: 82
End: 2022-07-28

## 2022-07-28 DIAGNOSIS — N64.4 BREAST PAIN: Primary | ICD-10-CM

## 2022-07-28 NOTE — TELEPHONE ENCOUNTER
Reason for Call: Request for an order or referral:    Order or referral being requested: Need to order Bilateral Diagnostic Mammo and Left Ultrasound. Pt was instructed to tell PCP about the problem. Please call pt.    Date needed: as soon as possible    Has the patient been seen by the PCP for this problem? not sure    Additional comments:  Pt was instructed to tell PCP about the problem. Please call pt.    Phone number Patient can be reached at:  Cell number on file:    Telephone Information:   Mobile [phone]       Best Time:  any    Can we leave a detailed message on this number?  YES    Call taken on 7/28/2022 at 12:11 PM by Kvng Blackwood

## 2022-07-29 NOTE — TELEPHONE ENCOUNTER
Received another call from the patient wondering about the status of getting the bilateral diagnostic mammogram and ultrasound placed. Patient would like to get this appointment scheduled to have these images completed ASAP.    Routing message to PCP as high priority. Order pended for review.    Can we leave a detailed message on this number? YES  Phone number patient can be reached at: Home number on file [phone] (home)    Melany Soni RN  [facility] Triage

## 2022-08-03 NOTE — TELEPHONE ENCOUNTER
Left breast tenderness - went in for Mammogram     Mentioned having left breast tenderness and they want dx mammo and US on left side     Sharp pains that come/go    States it's within breast, not having chest pains     Please advise or do you want to see pt first?     Requesting call back JORDYN ROBERTS, Triage RN  [facility] Internal Medicine [facility]

## 2022-08-03 NOTE — TELEPHONE ENCOUNTER
Called and notified pt of orders placed    Swati ROBERTS, Triage RN  [facility] Internal Medicine Clinic

## 2022-08-09 ENCOUNTER — HOSPITAL ENCOUNTER (OUTPATIENT)
Dept: MAMMOGRAPHY | Facility: CLINIC | Age: 82
Discharge: HOME | End: 2022-08-09
Attending: INTERNAL MEDICINE
Payer: MEDICARE

## 2022-08-09 DIAGNOSIS — N64.4 BREAST PAIN: ICD-10-CM

## 2022-08-09 PROCEDURE — 77062 BREAST TOMOSYNTHESIS BI: CPT

## 2022-08-09 PROCEDURE — 76642 ULTRASOUND BREAST LIMITED: CPT | Mod: LT

## 2022-09-02 ENCOUNTER — NURSE TRIAGE (OUTPATIENT)
Dept: NURSING | Facility: CLINIC | Age: 82
End: 2022-09-02

## 2022-09-03 NOTE — TELEPHONE ENCOUNTER
See Addendum to Nurse Triage encounter on 9/2/22 with KELLEY Carlos re: Head Injury    Ciera Glover, BELLA  [facility]

## 2022-09-03 NOTE — TELEPHONE ENCOUNTER
Call received from daughter, Alena.    She wanted to review the instructions about applying ice pack - frequency, duration.    Reviewed info from Care Advice:  USE A COLD PACK FOR PAIN, SWELLING, OR BRUISING:  * Put a cold pack or an ice bag (wrapped in a moist towel) on the area for 20 minutes.  * Repeat in 1 hour, then every 4 hours while awake.  * Continue this for the first 48 hours (2 days) after an injury.  * This will help decrease pain, swelling, and bruising.  * Caution: Avoid frostbite.    Ciera Glover RN  [facility]

## 2022-09-03 NOTE — TELEPHONE ENCOUNTER
Nurse Triage SBAR    Is this a 2nd Level Triage? YES, LICENSED PRACTITIONER REVIEW IS REQUIRED    Situation: Patient/ daughter calling with concerns of head injury.    Consent: obtained verbally from patient    Assessment:   Patient states that tonight around 9pm she was walking into her house she had to go up two stairs with a railing and lost balance and fell down hit her right hip and left arm and back of her head, no skin broken or injury to the hip/ arm. Patient states there is a hematoma or bump on the back of her head about 1.25 inches in size.      -Denies nausea, vomiting, dizziness, blurred vision   -Head 4/10 pain - feels like it is skin pain not headache etc.    -Denies neck pain   -Ice pack on for 10 minutes      Protocol Recommended Disposition:   Home Care, See HCP Within 4 Hours (Or PCP Triage)    Recommendation: Advised patient to wait for call-back after speaking with on-call provider . Reviewed concerning symptoms and when to call back.     Paged to provider      Dr. Amanda dunham 9:59pm :  -Watch it   -Tylenol is okay  - ice it   -Severe pain get her checked out     Nurse spoke with patient/ daughter regarding providers recommendation and what to look out for in the next 48 hours and when to call back or go into the ED. Patient/ daughter confirmed understanding.     Dany Carlos RN [facility] 9/2/2022 10:15 PM            Reason for Disposition    [1] Age over 64 years AND [2] swelling or bruise    Scalp swelling, bruise or pain    Additional Information    Negative: [1] ACUTE NEURO SYMPTOM AND [2] present now  (DEFINITION: difficult to awaken OR confused thinking and talking OR slurred speech OR weakness of arms OR unsteady walking)    Negative: Knocked out (unconscious) > 1 minute    Negative: Seizure (convulsion) occurred  (Exception: prior history of seizures and now alert and without Acute Neuro Symptoms)    Negative: Penetrating head injury (e.g., knife, gun shot wound,  "metal object)    Negative: [1] Major bleeding (e.g., actively dripping or spurting) AND [2] can't be stopped    Negative: [1] Dangerous mechanism of injury (e.g., MVA, diving, trampoline, contact sports, fall > 10 feet or 3 meters) AND [2] NECK pain AND [3] began < 1 hour after injury    Negative: Sounds like a life-threatening emergency to the triager    Negative: Can't remember what happened (amnesia)    Negative: Vomiting once or more    Negative: [1] Loss of vision or double vision AND [2] present now    Negative: Watery or blood-tinged fluid dripping from the NOSE or EARS now  (Exception: tears from crying or nosebleed from nasal trauma)    Negative: [1] One or two \"black eyes\" (bruising, purple color of eyelids) AND [2] onset within 24 hours of head injury    Negative: Large swelling or bruise > 2 inches (5 cm)    Negative: Skin is split open or gaping  (or length > 1/2 inch or 12 mm)    Negative: [1] Bleeding AND [2] won't stop after 10 minutes of direct pressure (using correct technique)    Negative: Sounds like a serious injury to the triager    Negative: [1] ACUTE NEURO SYMPTOM AND [2] now fine  (DEFINITION: difficult to awaken OR confused thinking and talking OR slurred speech OR weakness of arms OR unsteady walking)    Negative: [1] Knocked out (unconscious) < 1 minute AND [2] now fine    Negative: [1] SEVERE headache AND [2] not improved 2 hours after pain medicine/ice packs    Negative: Dangerous injury (e.g., MVA, diving, trampoline, contact sports, fall > 10 feet or 3 meters) or severe blow from hard object (e.g., golf club or baseball bat)    Negative: Taking Coumadin (warfarin) or other strong blood thinner, or known bleeding disorder (e.g., thrombocytopenia)    Negative: Suspicious history for the injury    Protocols used: HEAD INJURY-A-AH      "

## 2022-09-15 ENCOUNTER — ANCILLARY PROCEDURE (OUTPATIENT)
Dept: CT IMAGING | Facility: CLINIC | Age: 82
End: 2022-09-15
Attending: INTERNAL MEDICINE
Payer: MEDICARE

## 2022-09-15 DIAGNOSIS — Z85.820 PERSONAL HISTORY OF MALIGNANT MELANOMA OF SKIN: ICD-10-CM

## 2022-09-15 LAB
CREAT BLD-MCNC: 0.9 MG/DL (ref 0.5–1)
GFR SERPL CREATININE-BSD FRML MDRD: >60 ML/MIN/1.73M2

## 2022-09-15 PROCEDURE — 255N000002 HC RX 255 OP 636: Performed by: INTERNAL MEDICINE

## 2022-09-15 PROCEDURE — 70491 CT SOFT TISSUE NECK W/DYE: CPT

## 2022-09-15 PROCEDURE — 82565 ASSAY OF CREATININE: CPT

## 2022-09-15 PROCEDURE — 74177 CT ABD & PELVIS W/CONTRAST: CPT

## 2022-09-15 RX ADMIN — IOHEXOL 125 ML: 350 INJECTION, SOLUTION INTRAVENOUS at 10:30

## 2022-09-19 ENCOUNTER — TRANSFERRED RECORDS (OUTPATIENT)
Dept: HEALTH INFORMATION MANAGEMENT | Facility: CLINIC | Age: 82
End: 2022-09-19

## 2022-09-28 ENCOUNTER — ANCILLARY PROCEDURE (OUTPATIENT)
Dept: GENERAL RADIOLOGY | Facility: CLINIC | Age: 82
End: 2022-09-28
Attending: FAMILY MEDICINE
Payer: MEDICARE

## 2022-09-28 ENCOUNTER — OFFICE VISIT (OUTPATIENT)
Dept: ORTHOPEDICS | Facility: CLINIC | Age: 82
End: 2022-09-28
Payer: MEDICARE

## 2022-09-28 VITALS — SYSTOLIC BLOOD PRESSURE: 148 MMHG | DIASTOLIC BLOOD PRESSURE: 88 MMHG | BODY MASS INDEX: 28.55 KG/M2 | WEIGHT: 170 LBS

## 2022-09-28 DIAGNOSIS — S46.012A STRAIN OF MUSC/TEND THE ROTATOR CUFF OF LEFT SHOULDER, INIT: ICD-10-CM

## 2022-09-28 DIAGNOSIS — M25.512 ACUTE PAIN OF LEFT SHOULDER: Primary | ICD-10-CM

## 2022-09-28 DIAGNOSIS — M25.512 LEFT SHOULDER PAIN: ICD-10-CM

## 2022-09-28 PROCEDURE — 99203 OFFICE O/P NEW LOW 30 MIN: CPT | Performed by: FAMILY MEDICINE

## 2022-09-28 PROCEDURE — 73030 X-RAY EXAM OF SHOULDER: CPT | Mod: TC | Performed by: RADIOLOGY

## 2022-09-28 NOTE — PROGRESS NOTES
CHIEF COMPLAINT:  Pain of the Left Shoulder       HISTORY OF PRESENT ILLNESS  Ms. Mccormick is a pleasant 82 year old year old female who presents to clinic today with left shoulder pain.  Marah explains that she fell down the bottom 2 steps, and landed on shoulder.    Onset: sudden  Location: left shoulder  Quality:  sharp  Duration: 4 weeks   Severity: 8/10 at worst, better at rest  Timing:intermittent episodes with use  Modifying factors:  resting and non-use makes it better, raising hand above head, or putting on jacket makes it worse.  Associated signs & symptoms: None  Previous similar pain: No  Treatments to date:tylenol    Additional history: Since this time she has been able to use the arm but she has pain with any forward elevation and overhead motion.  She notes pain is in the anterolateral aspect of her shoulder.  No radiation down into the arm and the hand.  No significant neck pain.  She denies any headaches or any head trauma during this fall.    Review of Systems: A 10-point review of systems was obtained and is negative except for as noted in the HPI.    MEDICAL HISTORY  Patient Active Problem List   Diagnosis     Hyperlipidemia LDL goal <100     Hypothyroidism, unspecified type     Spigelian hernia       Current Outpatient Medications   Medication Sig Dispense Refill     atorvastatin (LIPITOR) 10 MG tablet TAKE 1 TABLET EVERY DAY 90 tablet 0     levothyroxine (SYNTHROID/LEVOTHROID) 88 MCG tablet TAKE 1 TABLET EVERY DAY 90 tablet 0     Multiple Vitamin (MULTI-VITAMIN DAILY PO) Take 1 Scoop by mouth daily       valsartan (DIOVAN) 80 MG tablet Take 1 tablet (80 mg) by mouth daily Take 80 mg by mouth daily 90 tablet 1       Allergies   Allergen Reactions     No Known Allergies        Family History   Problem Relation Age of Onset     Hypertension Brother      Hypertension Sister      Hyperlipidemia Sister      Other Cancer Sister         CLL     Substance Abuse Sister      Substance Abuse Father                Substance Abuse Brother      Substance Abuse Daughter      Substance Abuse Son        Additional medical/Social/Surgical histories reviewed in [facility] and updated as appropriate.       PHYSICAL EXAM  BP (!) 148/88   Wt 77.1 kg (170 lb)   BMI 28.55 kg/m      General  - normal appearance, in no obvious distress  Musculoskeletal -left shoulder  - inspection: normal bone and joint alignment, no obvious deformity, no scapular winging, no AC step-off  - palpation: mildly tender RC insertion, mild discomfort at the AC joint, normal clavicle, non-tender AC  - ROM:  painful but relatively intact for elevation, abduction internal and external rotation.  - strength: 5/5  strength, 5/5 in all shoulder planes  - special tests:  (-) Speed's  (+) Neer  (+) Hawkin's   (+) Simran's for pain  (-) Drop arm  (-) apprehension  (-) subscap lift-off  Neuro  - no sensory or motor deficit, grossly normal coordination, normal muscle tone  Skin  - no ecchymosis, erythema, warmth, or induration, no obvious rash  Psych  - interactive, appropriate, normal mood and affect     IMAGING : X-ray left shoulder 4 view. Final results and radiologist's interpretation, available in the [facility] health record. Images were reviewed with the patient/family members in the office today. My personal interpretation of the performed imaging is no acute osseous abnormality or significant degenerative changes of joint.       ASSESSMENT & PLAN  Ms. Mccormick is a 82 year old year old female who presents to clinic today with acute left shoulder pain after a fall 4 weeks ago.    Clinical examination and radiographs concerning for rotator cuff strain.  Negative for drop arm test or other found weakness to indicate a large rotator cuff tear.    Diagnosis:   Acute pain left shoulder  Traumatic rotator cuff strain left shoulder    At this time we discussed possible etiologies including partial tear, strain or contribution from AC joint sprain.  For the above  possible diagnoses, conservative treatment in the form of home exercise program, icing, relative rest and time would be significantly beneficial.  We discussed formal physical therapy however she like to hold off and start with the handout that I provided.  In the future we can consider subacromial injection if pain does persist.  I also reiterate consideration of formal PT if persisting.    Follow-up in 6 weeks.    It was a pleasure seeing Marah today.    Ronnie Crocker DO, CAQSM  Primary Care Sports Medicine

## 2022-09-28 NOTE — LETTER
9/28/2022         RE: Marah Mccormick  [address] [place] [id number]        Dear Colleague,    Thank you for referring your patient, Marah Mccormick, to the [facility] [place]. Please see a copy of my visit note below.    CHIEF COMPLAINT:  Pain of the Left Shoulder       HISTORY OF PRESENT ILLNESS  Ms. Mccormick is a pleasant 82 year old year old female who presents to clinic today with left shoulder pain.  Marah explains that she fell down the bottom 2 steps, and landed on shoulder.    Onset: sudden  Location: left shoulder  Quality:  sharp  Duration: 4 weeks   Severity: 8/10 at worst, better at rest  Timing:intermittent episodes with use  Modifying factors:  resting and non-use makes it better, raising hand above head, or putting on jacket makes it worse.  Associated signs & symptoms: None  Previous similar pain: No  Treatments to date:tylenol    Additional history: Since this time she has been able to use the arm but she has pain with any forward elevation and overhead motion.  She notes pain is in the anterolateral aspect of her shoulder.  No radiation down into the arm and the hand.  No significant neck pain.  She denies any headaches or any head trauma during this fall.    Review of Systems: A 10-point review of systems was obtained and is negative except for as noted in the HPI.    MEDICAL HISTORY  Patient Active Problem List   Diagnosis     Hyperlipidemia LDL goal <100     Hypothyroidism, unspecified type     Spigelian hernia       Current Outpatient Medications   Medication Sig Dispense Refill     atorvastatin (LIPITOR) 10 MG tablet TAKE 1 TABLET EVERY DAY 90 tablet 0     levothyroxine (SYNTHROID/LEVOTHROID) 88 MCG tablet TAKE 1 TABLET EVERY DAY 90 tablet 0     Multiple Vitamin (MULTI-VITAMIN DAILY PO) Take 1 Scoop by mouth daily       valsartan (DIOVAN) 80 MG tablet Take 1 tablet (80 mg) by mouth daily Take 80 mg by mouth daily 90 tablet 1       Allergies    Allergen Reactions     No Known Allergies        Family History   Problem Relation Age of Onset     Hypertension Brother      Hypertension Sister      Hyperlipidemia Sister      Other Cancer Sister         CLL     Substance Abuse Sister      Substance Abuse Father          1970     Substance Abuse Brother      Substance Abuse Daughter      Substance Abuse Son        Additional medical/Social/Surgical histories reviewed in [facility] and updated as appropriate.       PHYSICAL EXAM  BP (!) 148/88   Wt 77.1 kg (170 lb)   BMI 28.55 kg/m      General  - normal appearance, in no obvious distress  Musculoskeletal -left shoulder  - inspection: normal bone and joint alignment, no obvious deformity, no scapular winging, no AC step-off  - palpation: mildly tender RC insertion, mild discomfort at the AC joint, normal clavicle, non-tender AC  - ROM:  painful but relatively intact for elevation, abduction internal and external rotation.  - strength: 5/5  strength, 5/5 in all shoulder planes  - special tests:  (-) Speed's  (+) Neer  (+) Hawkin's   (+) Simran's for pain  (-) Drop arm  (-) apprehension  (-) subscap lift-off  Neuro  - no sensory or motor deficit, grossly normal coordination, normal muscle tone  Skin  - no ecchymosis, erythema, warmth, or induration, no obvious rash  Psych  - interactive, appropriate, normal mood and affect     IMAGING : X-ray left shoulder 4 view. Final results and radiologist's interpretation, available in the [facility] health record. Images were reviewed with the patient/family members in the office today. My personal interpretation of the performed imaging is no acute osseous abnormality or significant degenerative changes of joint.       ASSESSMENT & PLAN  Ms. Mccormick is a 82 year old year old female who presents to clinic today with acute left shoulder pain after a fall 4 weeks ago.    Clinical examination and radiographs concerning for rotator cuff strain.  Negative for drop arm test or other  found weakness to indicate a large rotator cuff tear.    Diagnosis:   Acute pain left shoulder  Traumatic rotator cuff strain left shoulder    At this time we discussed possible etiologies including partial tear, strain or contribution from AC joint sprain.  For the above possible diagnoses, conservative treatment in the form of home exercise program, icing, relative rest and time would be significantly beneficial.  We discussed formal physical therapy however she like to hold off and start with the handout that I provided.  In the future we can consider subacromial injection if pain does persist.  I also reiterate consideration of formal PT if persisting.    Follow-up in 6 weeks.    It was a pleasure seeing Marah today.    Ronnie Crocker DO, [facility]  Primary Care Sports Medicine      Again, thank you for allowing me to participate in the care of your patient.        Sincerely,        Ronnie Crocker DO

## 2022-10-07 ASSESSMENT — ENCOUNTER SYMPTOMS
SORE THROAT: 0
DIZZINESS: 0
MYALGIAS: 0
SHORTNESS OF BREATH: 0
JOINT SWELLING: 0
CHILLS: 0
PARESTHESIAS: 0
NAUSEA: 0
HEMATOCHEZIA: 0
PALPITATIONS: 0
DYSURIA: 0
BREAST MASS: 0
CONSTIPATION: 0
HEMATURIA: 0
NERVOUS/ANXIOUS: 0
FREQUENCY: 0
DIARRHEA: 0
HEARTBURN: 0
FEVER: 0
WEAKNESS: 0
COUGH: 0
EYE PAIN: 0
ABDOMINAL PAIN: 0
ARTHRALGIAS: 0
HEADACHES: 0

## 2022-10-07 ASSESSMENT — ACTIVITIES OF DAILY LIVING (ADL): CURRENT_FUNCTION: NO ASSISTANCE NEEDED

## 2022-10-13 ENCOUNTER — OFFICE VISIT (OUTPATIENT)
Dept: FAMILY MEDICINE | Facility: CLINIC | Age: 82
End: 2022-10-13
Payer: MEDICARE

## 2022-10-13 VITALS
BODY MASS INDEX: 27.99 KG/M2 | DIASTOLIC BLOOD PRESSURE: 89 MMHG | WEIGHT: 168 LBS | SYSTOLIC BLOOD PRESSURE: 150 MMHG | TEMPERATURE: 97.7 F | OXYGEN SATURATION: 95 % | HEART RATE: 63 BPM | HEIGHT: 65 IN | RESPIRATION RATE: 16 BRPM

## 2022-10-13 DIAGNOSIS — Z00.00 WELLNESS EXAMINATION: Primary | ICD-10-CM

## 2022-10-13 DIAGNOSIS — E03.9 HYPOTHYROIDISM, UNSPECIFIED TYPE: ICD-10-CM

## 2022-10-13 DIAGNOSIS — Z23 NEED FOR PROPHYLACTIC VACCINATION AND INOCULATION AGAINST INFLUENZA: ICD-10-CM

## 2022-10-13 DIAGNOSIS — C43.4 MELANOMA OF SCALP (H): ICD-10-CM

## 2022-10-13 DIAGNOSIS — E78.5 HYPERLIPIDEMIA LDL GOAL <100: ICD-10-CM

## 2022-10-13 LAB
BASOPHILS # BLD AUTO: 0.1 10E3/UL (ref 0–0.2)
BASOPHILS NFR BLD AUTO: 1 %
EOSINOPHIL # BLD AUTO: 0.1 10E3/UL (ref 0–0.7)
EOSINOPHIL NFR BLD AUTO: 2 %
ERYTHROCYTE [DISTWIDTH] IN BLOOD BY AUTOMATED COUNT: 12.4 % (ref 10–15)
HBA1C MFR BLD: 5.3 % (ref 0–5.6)
HCT VFR BLD AUTO: 46 % (ref 35–47)
HGB BLD-MCNC: 15.2 G/DL (ref 11.7–15.7)
IMM GRANULOCYTES # BLD: 0 10E3/UL
IMM GRANULOCYTES NFR BLD: 0 %
LYMPHOCYTES # BLD AUTO: 1.7 10E3/UL (ref 0.8–5.3)
LYMPHOCYTES NFR BLD AUTO: 27 %
MCH RBC QN AUTO: 30.2 PG (ref 26.5–33)
MCHC RBC AUTO-ENTMCNC: 33 G/DL (ref 31.5–36.5)
MCV RBC AUTO: 92 FL (ref 78–100)
MONOCYTES # BLD AUTO: 0.5 10E3/UL (ref 0–1.3)
MONOCYTES NFR BLD AUTO: 8 %
NEUTROPHILS # BLD AUTO: 3.9 10E3/UL (ref 1.6–8.3)
NEUTROPHILS NFR BLD AUTO: 61 %
PLATELET # BLD AUTO: 169 10E3/UL (ref 150–450)
RBC # BLD AUTO: 5.03 10E6/UL (ref 3.8–5.2)
WBC # BLD AUTO: 6.3 10E3/UL (ref 4–11)

## 2022-10-13 PROCEDURE — 90662 IIV NO PRSV INCREASED AG IM: CPT | Performed by: INTERNAL MEDICINE

## 2022-10-13 PROCEDURE — 85025 COMPLETE CBC W/AUTO DIFF WBC: CPT | Performed by: INTERNAL MEDICINE

## 2022-10-13 PROCEDURE — G0008 ADMIN INFLUENZA VIRUS VAC: HCPCS | Performed by: INTERNAL MEDICINE

## 2022-10-13 PROCEDURE — G0439 PPPS, SUBSEQ VISIT: HCPCS | Performed by: INTERNAL MEDICINE

## 2022-10-13 PROCEDURE — 80048 BASIC METABOLIC PNL TOTAL CA: CPT | Performed by: INTERNAL MEDICINE

## 2022-10-13 PROCEDURE — 36415 COLL VENOUS BLD VENIPUNCTURE: CPT | Performed by: INTERNAL MEDICINE

## 2022-10-13 PROCEDURE — 83036 HEMOGLOBIN GLYCOSYLATED A1C: CPT | Performed by: INTERNAL MEDICINE

## 2022-10-13 PROCEDURE — 84443 ASSAY THYROID STIM HORMONE: CPT | Performed by: INTERNAL MEDICINE

## 2022-10-13 PROCEDURE — 80061 LIPID PANEL: CPT | Performed by: INTERNAL MEDICINE

## 2022-10-13 ASSESSMENT — ACTIVITIES OF DAILY LIVING (ADL): CURRENT_FUNCTION: NO ASSISTANCE NEEDED

## 2022-10-13 ASSESSMENT — PAIN SCALES - GENERAL: PAINLEVEL: NO PAIN (0)

## 2022-10-13 NOTE — PROGRESS NOTES
"SUBJECTIVE:   Marah is a 82 year old who presents for Preventive Visit.    Patient has been advised of split billing requirements and indicates understanding: Yes  Are you in the first 12 months of your Medicare coverage?  No    Healthy Habits:     In general, how would you rate your overall health?  Good    Frequency of exercise:  4-5 days/week    Duration of exercise:  30-45 minutes    Do you usually eat at least 4 servings of fruit and vegetables a day, include whole grains    & fiber and avoid regularly eating high fat or \"junk\" foods?  Yes    Taking medications regularly:  Yes    Medication side effects:  Not applicable    Ability to successfully perform activities of daily living:  No assistance needed    Home Safety:  No safety concerns identified    Hearing Impairment:  No hearing concerns    In the past 6 months, have you been bothered by leaking of urine? Yes    In general, how would you rate your overall mental or emotional health?  Good      PHQ-2 Total Score: 0    Additional concerns today:  Yes    Do you feel safe in your environment? Yes    Have you ever done Advance Care Planning? (For example, a Health Directive, POLST, or a discussion with a medical provider or your loved ones about your wishes): No, advance care planning information given to patient to review.  Patient plans to discuss their wishes with loved ones or provider.      Fall risk  Fallen 2 or more times in the past year?: No  Any fall with injury in the past year?: Yes    Cognitive Screening    1) Repeat 3 items (Leader, Season, Table)    2) Clock draw: ABNORMAL wrong  3) 3 item recall: Recalls 3 objects  Results: ABNORMAL clock, 1-2 items recalled: PROBABLE COGNITIVE IMPAIRMENT, **INFORM PROVIDER**    Mini-CogTM Copyright JOE Cadet. Licensed by the author for use in [facility]; reprinted with permission (celina@.[facility]). All rights reserved.      Do you have sleep apnea, excessive snoring or daytime drowsiness?: " no    Reviewed and updated as needed this visit by clinical staff                  Reviewed and updated as needed this visit by Provider                 Social History     Tobacco Use     Smoking status: Former     Packs/day: 0.50     Years: 2.00     Pack years: 1.00     Types: Cigarettes     Start date: 1961     Quit date: 1963     Years since quittin.4     Smokeless tobacco: Never   Substance Use Topics     Alcohol use: Yes     Comment: 1 glass of wine daily     If you drink alcohol do you typically have >3 drinks per day or >7 drinks per week? No    Alcohol Use 10/7/2022   Prescreen: >3 drinks/day or >7 drinks/week? No   Prescreen: >3 drinks/day or >7 drinks/week? -   AUDIT SCORE  -     AUDIT - Alcohol Use Disorders Identification Test - Reproduced from the World Health Organization Audit 2001 (Second Edition) 2021   1.  How often do you have a drink containing alcohol? Monthly or less   2.  How many drinks containing alcohol do you have on a typical day when you are drinking? 1 or 2   3.  How often do you have five or more drinks on one occasion? Never   4.  How often during the last year have you found that you were not able to stop drinking once you had started? Never   5.  How often during the last year have you failed to do what was normally expected of you because of drinking? Never   6.  How often during the last year have you needed a first drink in the morning to get yourself going after a heavy drinking session? Never   7.  How often during the last year have you had a feeling of guilt or remorse after drinking? Never   8.  How often during the last year have you been unable to remember what happened the night before because of your drinking? Never   9.  Have you or someone else been injured because of your drinking? No   10. Has a relative, friend, doctor or other health care worker been concerned about your drinking or suggested you cut down? No   TOTAL SCORE 1           Current  "providers sharing in care for this patient include:   Patient Care Team:  Arelis High MD as PCP - General (Internal Medicine)  Arelis High MD as Assigned PCP  Gokul Lujan MD as Assigned Surgical Provider    The following health maintenance items are reviewed in Epic and correct as of today:  Health Maintenance   Topic Date Due     HEPATITIS B IMMUNIZATION (1 of 3 - 3-dose series) Never done     COVID-19 Vaccine (5 - Booster for Pfizer series) 07/05/2022     MEDICARE ANNUAL WELLNESS VISIT  08/04/2022     ANNUAL REVIEW OF HM ORDERS  08/04/2022     INFLUENZA VACCINE (1) 09/01/2022     FALL RISK ASSESSMENT  10/13/2023     LIPID  08/04/2026     ADVANCE CARE PLANNING  08/04/2026     DTAP/TDAP/TD IMMUNIZATION (2 - Td or Tdap) 07/03/2029     DEXA  05/10/2033     PHQ-2 (once per calendar year)  Completed     Pneumococcal Vaccine: 65+ Years  Completed     ZOSTER IMMUNIZATION  Completed     IPV IMMUNIZATION  Aged Out     MENINGITIS IMMUNIZATION  Aged Out     MAMMO SCREENING  Discontinued     Labs reviewed in EPIC    Pertinent mammograms are reviewed under the imaging tab.    Review of Systems  Constitutional, HEENT, cardiovascular, pulmonary, GI, , musculoskeletal, neuro, skin, endocrine and psych systems are negative, except as otherwise noted.    OBJECTIVE:   BP (!) 150/89 (BP Location: Right arm, Patient Position: Sitting, Cuff Size: Adult Regular)   Pulse 63   Temp 97.7  F (36.5  C) (Temporal)   Resp 16   Ht 1.643 m (5' 4.7\")   Wt 76.2 kg (168 lb)   SpO2 95%   BMI 28.22 kg/m   Estimated body mass index is 28.55 kg/m  as calculated from the following:    Height as of 10/21/21: 1.643 m (5' 4.7\").    Weight as of 9/28/22: 77.1 kg (170 lb).  Physical Exam  GENERAL APPEARANCE: healthy, alert and no distress  EYES: Eyes grossly normal to inspection, PERRL and conjunctivae and sclerae normal  HENT: ear canals and TM's normal, nose and mouth without ulcers or lesions, oropharynx clear and oral " "mucous membranes moist  NECK: no adenopathy, no asymmetry, masses, or scars and thyroid normal to palpation  RESP: lungs clear to auscultation - no rales, rhonchi or wheezes  CV: regular rate and rhythm, normal S1 S2, no S3 or S4, no murmur, click or rub, no peripheral edema and peripheral pulses strong  ABDOMEN: soft, nontender, no hepatosplenomegaly, no masses and bowel sounds normal  MS: no musculoskeletal defects are noted and gait is age appropriate without ataxia  SKIN: no suspicious lesions or rashes  NEURO: Normal strength and tone, sensory exam grossly normal, mentation intact and speech normal  PSYCH: mentation appears normal and affect normal/bright    Diagnostic Test Results:  Labs reviewed in Epic    ASSESSMENT / PLAN:   Marah was seen today for physical.    Diagnoses and all orders for this visit:    Wellness examination  Hyperlipidemia LDL goal <100  Hypothyroidism, unspecified type    -     REVIEW OF HEALTH MAINTENANCE PROTOCOL ORDERS  -     TSH with free T4 reflex; Future  -     Lipid panel reflex to direct LDL Fasting; Future  -     Hemoglobin A1c; Future  -     CBC with platelets and differential; Future  -     Basic metabolic panel  (Ca, Cl, CO2, Creat, Gluc, K, Na, BUN); Future      Melanoma of scalp (H)  - Continue 3 months derm clinic follow up going forward.       Need for prophylactic vaccination and inoculation against influenza  -     INFLUENZA, QUAD, HIGH DOSE, PF, 65YR + (FLUZONE HD)  -     ADMIN INFLUENZA (For MEDICARE Patients ONLY) []          Patient has been advised of split billing requirements and indicates understanding: Yes    COUNSELING:  Reviewed preventive health counseling, as reflected in patient instructions  Special attention given to:       Regular exercise       Healthy diet/nutrition    Estimated body mass index is 28.55 kg/m  as calculated from the following:    Height as of 10/21/21: 1.643 m (5' 4.7\").    Weight as of 9/28/22: 77.1 kg (170 lb).    Weight " management plan: Discussed healthy diet and exercise guidelines    She reports that she quit smoking about 59 years ago. Her smoking use included cigarettes. She started smoking about 61 years ago. She has a 1.00 pack-year smoking history. She has never used smokeless tobacco.      Appropriate preventive services were discussed with this patient, including applicable screening as appropriate for cardiovascular disease, diabetes, osteopenia/osteoporosis, and glaucoma.  As appropriate for age/gender, discussed screening for colorectal cancer, prostate cancer, breast cancer, and cervical cancer. Checklist reviewing preventive services available has been given to the patient.    Reviewed patients plan of care and provided an AVS. The Basic Care Plan (routine screening as documented in Health Maintenance) for Marah meets the Care Plan requirement. This Care Plan has been established and reviewed with the Patient.    Counseling Resources:  ATP IV Guidelines  Pooled Cohorts Equation Calculator  Breast Cancer Risk Calculator  Breast Cancer: Medication to Reduce Risk  FRAX Risk Assessment  ICSI Preventive Guidelines  Dietary Guidelines for Americans, 2010  [employer]s MyPlate  ASA Prophylaxis  Lung CA Screening    Arelis High MD  [facility]    Identified Health Risks:

## 2022-10-15 ENCOUNTER — MYC MEDICAL ADVICE (OUTPATIENT)
Dept: FAMILY MEDICINE | Facility: CLINIC | Age: 82
End: 2022-10-15

## 2022-10-15 DIAGNOSIS — R89.9 ABNORMAL LABORATORY TEST: Primary | ICD-10-CM

## 2022-10-15 LAB
ANION GAP SERPL CALCULATED.3IONS-SCNC: 4 MMOL/L (ref 3–14)
BUN SERPL-MCNC: 12 MG/DL (ref 7–30)
CALCIUM SERPL-MCNC: 9.4 MG/DL (ref 8.5–10.1)
CHLORIDE BLD-SCNC: 108 MMOL/L (ref 94–109)
CHOLEST SERPL-MCNC: 120 MG/DL
CO2 SERPL-SCNC: 28 MMOL/L (ref 20–32)
CREAT SERPL-MCNC: 0.77 MG/DL (ref 0.52–1.04)
FASTING STATUS PATIENT QL REPORTED: YES
GFR SERPL CREATININE-BSD FRML MDRD: 78 ML/MIN/1.73M2
GLUCOSE BLD-MCNC: 80 MG/DL (ref 70–99)
HDLC SERPL-MCNC: 51 MG/DL
LDLC SERPL CALC-MCNC: 57 MG/DL
NONHDLC SERPL-MCNC: 69 MG/DL
POTASSIUM BLD-SCNC: 5.8 MMOL/L (ref 3.4–5.3)
SODIUM SERPL-SCNC: 140 MMOL/L (ref 133–144)
TRIGL SERPL-MCNC: 62 MG/DL
TSH SERPL DL<=0.005 MIU/L-ACNC: 0.91 MU/L (ref 0.4–4)

## 2022-10-17 NOTE — TELEPHONE ENCOUNTER
Called, advised pt to schedule a repeat potassium lab today or tomorrow to verify. Last potassium result may be a lab error due to hemolyzed sample given normal kidney function result.

## 2022-10-18 ENCOUNTER — LAB (OUTPATIENT)
Dept: LAB | Facility: CLINIC | Age: 82
End: 2022-10-18
Payer: MEDICARE

## 2022-10-18 DIAGNOSIS — R89.9 ABNORMAL LABORATORY TEST: ICD-10-CM

## 2022-10-18 PROCEDURE — 36415 COLL VENOUS BLD VENIPUNCTURE: CPT

## 2022-10-18 PROCEDURE — 84132 ASSAY OF SERUM POTASSIUM: CPT

## 2022-10-18 NOTE — TELEPHONE ENCOUNTER
Please see "[employer]" message and replay it directly to patient.    Anabell Bethea ,[facility]

## 2022-10-19 LAB — POTASSIUM BLD-SCNC: 4.8 MMOL/L (ref 3.4–5.3)

## 2022-10-26 ENCOUNTER — OFFICE VISIT (OUTPATIENT)
Dept: FAMILY MEDICINE | Facility: CLINIC | Age: 82
End: 2022-10-26
Payer: MEDICARE

## 2022-10-26 VITALS
WEIGHT: 169 LBS | RESPIRATION RATE: 16 BRPM | TEMPERATURE: 97.7 F | DIASTOLIC BLOOD PRESSURE: 85 MMHG | HEART RATE: 58 BPM | BODY MASS INDEX: 28.16 KG/M2 | HEIGHT: 65 IN | OXYGEN SATURATION: 98 % | SYSTOLIC BLOOD PRESSURE: 127 MMHG

## 2022-10-26 DIAGNOSIS — I10 ESSENTIAL HYPERTENSION: ICD-10-CM

## 2022-10-26 DIAGNOSIS — E03.9 HYPOTHYROIDISM, UNSPECIFIED TYPE: ICD-10-CM

## 2022-10-26 DIAGNOSIS — Z23 HIGH PRIORITY FOR 2019-NCOV VACCINE: Primary | ICD-10-CM

## 2022-10-26 DIAGNOSIS — E78.5 HYPERLIPIDEMIA LDL GOAL <100: ICD-10-CM

## 2022-10-26 PROCEDURE — 91312 COVID-19,PF,PFIZER BOOSTER BIVALENT: CPT | Performed by: INTERNAL MEDICINE

## 2022-10-26 PROCEDURE — 99214 OFFICE O/P EST MOD 30 MIN: CPT | Performed by: INTERNAL MEDICINE

## 2022-10-26 PROCEDURE — 0124A COVID-19,PF,PFIZER BOOSTER BIVALENT: CPT | Performed by: INTERNAL MEDICINE

## 2022-10-26 ASSESSMENT — PAIN SCALES - GENERAL: PAINLEVEL: NO PAIN (0)

## 2022-10-26 NOTE — PROGRESS NOTES
Ovidio Crystal is a 82 year old presenting for the following health issues:      History of Present Illness       Reason for visit:  Covid   3rd booster shot & review lab test results    She eats 2-3 servings of fruits and vegetables daily.She consumes 1 sweetened beverage(s) daily.She exercises with enough effort to increase her heart rate 20 to 29 minutes per day.  She exercises with enough effort to increase her heart rate 5 days per week.   She is taking medications regularly.         Current Medications:     Current Outpatient Medications   Medication Sig Dispense Refill     atorvastatin (LIPITOR) 10 MG tablet TAKE 1 TABLET EVERY DAY 90 tablet 0     levothyroxine (SYNTHROID/LEVOTHROID) 88 MCG tablet TAKE 1 TABLET EVERY DAY 90 tablet 0     Multiple Vitamin (MULTI-VITAMIN DAILY PO) Take 1 Scoop by mouth daily       valsartan (DIOVAN) 80 MG tablet Take 1 tablet (80 mg) by mouth daily Take 80 mg by mouth daily 90 tablet 1         Allergies:      Allergies   Allergen Reactions     No Known Allergies             Past Medical History:     Past Medical History:   Diagnosis Date     Cancer (H) 2019    Had melanoma & had surgery  on 2119     Hypertension 2015    Taking Valsartan 80 Mg 1 daily     Thyroid disease     Taking Levothyroxine 88 mg 1 day in morning         Past Surgical History:     Past Surgical History:   Procedure Laterality Date     BIOPSY      Melanoma     COLONOSCOPY       VASCULAR SURGERY  2013    Varicose vein surgery         Family Medical History:     Family History   Problem Relation Age of Onset     Hypertension Brother      Hypertension Sister      Hyperlipidemia Sister      Other Cancer Sister         CLL     Substance Abuse Sister      Substance Abuse Father               Substance Abuse Brother      Substance Abuse Daughter      Substance Abuse Son          Social History:     Social History     Socioeconomic History      "Marital status:      Spouse name: Not on file     Number of children: Not on file     Years of education: Not on file     Highest education level: Not on file   Occupational History     Not on file   Tobacco Use     Smoking status: Former     Packs/day: 0.50     Years: 2.00     Pack years: 1.00     Types: Cigarettes     Start date: 1961     Quit date: 1963     Years since quittin.4     Smokeless tobacco: Never   Substance and Sexual Activity     Alcohol use: Yes     Comment: 1 glass of wine daily     Drug use: Never     Sexual activity: Not Currently     Partners: Male   Other Topics Concern     Parent/sibling w/ CABG, MI or angioplasty before 65F 55M? No   Social History Narrative     Not on file     Social Determinants of Health     Financial Resource Strain: Not on file   Food Insecurity: Not on file   Transportation Needs: Not on file   Physical Activity: Not on file   Stress: Not on file   Social Connections: Not on file   Intimate Partner Violence: Not on file   Housing Stability: Not on file           Review of System:     Constitutional: Negative for fever or chills  Skin: Negative for rashes  Ears/Nose/Throat: Negative for nasal congestion, sore throat  Respiratory: No shortness of breath, dyspnea on exertion, cough, or hemoptysis  Cardiovascular: Negative for chest pain  Gastrointestinal: Negative for nausea, vomiting  Genitourinary: Negative for dysuria, hematuria  Musculoskeletal: Negative for myalgias  Neurologic: Negative for headaches  Psychiatric: Negative for depression, anxiety  Hematologic/Lymphatic/Immunologic: Negative  Endocrine: Negative  Behavioral: Negative for tobacco use       Physical Exam:   /85   Pulse 58   Temp 97.7  F (36.5  C) (Temporal)   Resp 16   Ht 1.643 m (5' 4.7\")   Wt 76.7 kg (169 lb)   SpO2 98%   BMI 28.38 kg/m      GENERAL: alert and no distress  EYES: eyes grossly normal to inspection, and conjunctivae and sclerae normal  HENT: " Normocephalic atraumatic. Nose and mouth without ulcers or lesions  NECK: supple  RESP: lungs clear to auscultation   CV: regular rate and rhythm, normal S1 S2  LYMPH: no peripheral edema   ABDOMEN: nondistended  MS: no gross musculoskeletal defects noted  SKIN: no suspicious lesions or rashes  NEURO: Alert & Oriented x 3.   PSYCH: mentation appears normal, affect normal        Diagnostic Test Results:     Diagnostic Test Results:  Labs reviewed in Epic    ASSESSMENT/PLAN:       Marah was seen today for recheck and imm/inj.    Diagnoses and all orders for this visit:    High priority for 2019-nCoV vaccine  -     COVID-19,PF,PFIZER BOOSTER BIVALENT 12+Yrs    Hyperlipidemia LDL goal <100  - stable, continue current therapy    Hypothyroidism, unspecified type  - stable, continue current therapy    Essential hypertension  - stable, continue current therapy          Follow Up Plan:     Patient is instructed to return to Internal Medicine clinic for follow-up visit in 6 months.        Arelis High MD  Internal Medicine  [facility]

## 2022-10-29 DIAGNOSIS — E03.9 HYPOTHYROIDISM, UNSPECIFIED TYPE: ICD-10-CM

## 2022-10-29 DIAGNOSIS — E78.5 HYPERLIPIDEMIA LDL GOAL <100: ICD-10-CM

## 2022-11-01 RX ORDER — VALSARTAN 80 MG/1
TABLET ORAL
Qty: 90 TABLET | Refills: 2 | Status: SHIPPED | OUTPATIENT
Start: 2022-11-01 | End: 2023-11-08

## 2022-11-01 RX ORDER — LEVOTHYROXINE SODIUM 88 UG/1
TABLET ORAL
Qty: 90 TABLET | Refills: 2 | Status: SHIPPED | OUTPATIENT
Start: 2022-11-01 | End: 2023-11-09

## 2022-11-01 NOTE — TELEPHONE ENCOUNTER
Prescription approved per [facility] Refill Protocol.  Genesis DIAS RN  [facility]

## 2023-01-25 ENCOUNTER — LAB REQUISITION (OUTPATIENT)
Dept: LAB | Facility: CLINIC | Age: 83
End: 2023-01-25
Payer: MEDICARE

## 2023-01-25 DIAGNOSIS — L60.3 NAIL DYSTROPHY: ICD-10-CM

## 2023-01-25 PROCEDURE — 87106 FUNGI IDENTIFICATION YEAST: CPT | Mod: ORL | Performed by: DERMATOLOGY

## 2023-02-10 DIAGNOSIS — E78.5 HYPERLIPIDEMIA LDL GOAL <100: ICD-10-CM

## 2023-02-13 RX ORDER — ATORVASTATIN CALCIUM 10 MG/1
TABLET, FILM COATED ORAL
Qty: 90 TABLET | Refills: 0 | Status: SHIPPED | OUTPATIENT
Start: 2023-02-13 | End: 2023-07-26

## 2023-02-23 ENCOUNTER — NURSE TRIAGE (OUTPATIENT)
Dept: NURSING | Facility: CLINIC | Age: 83
End: 2023-02-23

## 2023-02-23 ENCOUNTER — VIRTUAL VISIT (OUTPATIENT)
Dept: FAMILY MEDICINE | Facility: CLINIC | Age: 83
End: 2023-02-23
Payer: MEDICARE

## 2023-02-23 DIAGNOSIS — R05.1 ACUTE COUGH: ICD-10-CM

## 2023-02-23 DIAGNOSIS — R53.81 MALAISE: ICD-10-CM

## 2023-02-23 DIAGNOSIS — U07.1 INFECTION DUE TO 2019 NOVEL CORONAVIRUS: ICD-10-CM

## 2023-02-23 DIAGNOSIS — U07.1 INFECTION DUE TO 2019 NOVEL CORONAVIRUS: Primary | ICD-10-CM

## 2023-02-23 PROCEDURE — 99442 PR PHYSICIAN TELEPHONE EVALUATION 11-20 MIN: CPT | Mod: 95 | Performed by: INTERNAL MEDICINE

## 2023-02-23 RX ORDER — GUAIFENESIN/DEXTROMETHORPHAN 100-10MG/5
10 SYRUP ORAL EVERY 4 HOURS PRN
Qty: 354 ML | Refills: 3 | Status: SHIPPED | OUTPATIENT
Start: 2023-02-23 | End: 2023-03-02

## 2023-02-23 RX ORDER — DEXTROMETHORPHAN HBR. AND GUAIFENESIN 10; 100 MG/5ML; MG/5ML
5 SOLUTION ORAL 4 TIMES DAILY PRN
Qty: 354 ML | Refills: 3 | Status: SHIPPED | OUTPATIENT
Start: 2023-02-23 | End: 2023-03-02

## 2023-02-23 NOTE — PROGRESS NOTES
Marah is a 82 year old who is being evaluated via a billable telephone visit.      What phone number would you like to be contacted at?   How would you like to obtain your AVS? Chuy  Distant Location (provider location):  Off-site      Subjective   Marah is a 82 year old presenting for the following health issues:  Covid Concern      HPI         Reason for visit:  Covid infection    She eats 2-3 servings of fruits and vegetables daily.She consumes 1 sweetened beverage(s) daily.She exercises with enough effort to increase her heart rate 20 to 29 minutes per day.  She exercises with enough effort to increase her heart rate 5 days per week.   She is taking medications regularly.         Current Medications:     Current Outpatient Medications   Medication Sig Dispense Refill     atorvastatin (LIPITOR) 10 MG tablet TAKE 1 TABLET EVERY DAY 90 tablet 0     dextromethorphan-guaiFENesin (TUSSIN DM)  MG/5ML liquid Take 5 mLs by mouth 4 times daily as needed (cough) 354 mL 3     guaiFENesin-dextromethorphan (ROBITUSSIN DM) 100-10 MG/5ML syrup Take 10 mLs by mouth every 4 hours as needed for cough 354 mL 3     levothyroxine (SYNTHROID/LEVOTHROID) 88 MCG tablet TAKE 1 TABLET EVERY DAY 90 tablet 2     Multiple Vitamin (MULTI-VITAMIN DAILY PO) Take 1 Scoop by mouth daily       valsartan (DIOVAN) 80 MG tablet TAKE 1 TABLET EVERY DAY 90 tablet 2     LAGEVRIO 200 MG capsule TAKE 4 CAPSULES(800 MG) BY MOUTH EVERY 12 HOURS 40 capsule 0         Allergies:      Allergies   Allergen Reactions     No Known Allergies             Past Medical History:     Past Medical History:   Diagnosis Date     Cancer (H) December 2019    Had melanoma & had surgery  on December 18 2119     Hypertension January 2015    Taking Valsartan 80 Mg 1 daily     Thyroid disease 2000    Taking Levothyroxine 88 mg 1 day in morning         Past Surgical History:     Past Surgical History:   Procedure Laterality Date     BIOPSY  2019    Melanoma      COLONOSCOPY  2019     VASCULAR SURGERY  2013    Varicose vein surgery         Family Medical History:     Family History   Problem Relation Age of Onset     Hypertension Brother      Hypertension Sister      Hyperlipidemia Sister      Other Cancer Sister         CLL     Substance Abuse Sister      Substance Abuse Father          1970     Substance Abuse Brother      Substance Abuse Daughter      Substance Abuse Son          Social History:     Social History     Socioeconomic History     Marital status:      Spouse name: Not on file     Number of children: Not on file     Years of education: Not on file     Highest education level: Not on file   Occupational History     Not on file   Tobacco Use     Smoking status: Former     Packs/day: 0.50     Years: 2.00     Pack years: 1.00     Types: Cigarettes     Start date: 1961     Quit date: 1963     Years since quittin.8     Smokeless tobacco: Never   Substance and Sexual Activity     Alcohol use: Yes     Comment: 1 glass of wine daily     Drug use: Never     Sexual activity: Not Currently     Partners: Male   Other Topics Concern     Parent/sibling w/ CABG, MI or angioplasty before 65F 55M? No   Social History Narrative     Not on file     Social Determinants of Health     Financial Resource Strain: Not on file   Food Insecurity: Not on file   Transportation Needs: Not on file   Physical Activity: Not on file   Stress: Not on file   Social Connections: Not on file   Intimate Partner Violence: Not on file   Housing Stability: Not on file           Review of System:     Constitutional: positive for fever or chills  Ears/Nose/Throat: positive for nasal congestion, sore throat  Respiratory: positive for cough, COVID infection  Cardiovascular: Negative for chest pain  Behavioral: Negative for tobacco use       Physical Exam:   LMP  (LMP Unknown)   Breastfeeding No     RESP: cough present   NEURO: Alert & Oriented x 3.   PSYCH:  mentation appears normal, affect normal        Diagnostic Test Results:     Diagnostic Test Results:  Labs reviewed in Epic    ASSESSMENT/PLAN:       Marah was seen today for recheck and imm/inj.    Diagnoses and all orders for this visit:    Marah was seen today for covid concern.    Diagnoses and all orders for this visit:    Infection due to 2019 novel coronavirus  -     Discontinue: molnupiravir (LAGEVRIO) 200 MG capsule; Take 4 capsules (800 mg) by mouth every 12 hours  -     dextromethorphan-guaiFENesin (TUSSIN DM)  MG/5ML liquid; Take 5 mLs by mouth 4 times daily as needed (cough)  -     Discontinue: molnupiravir (LAGEVRIO) 200 MG capsule; Take 4 capsules (800 mg) by mouth every 12 hours  -     guaiFENesin-dextromethorphan (ROBITUSSIN DM) 100-10 MG/5ML syrup; Take 10 mLs by mouth every 4 hours as needed for cough    Acute cough    Malaise              Follow Up Plan:     Patient is instructed to return to Internal Medicine clinic for follow-up visit in 1 week.        Arelis High MD  Internal Medicine  [facility]      telephone visit duration including chart review, medication management: 20 minutes

## 2023-02-23 NOTE — TELEPHONE ENCOUNTER
Pt's son called     Conferenced patient into call and she gave verbal ok to speak with Elias    States she spoke with PCP who advised Molnuprivir for COVID19 and cough med     As triage was speaking with pt and son PCP approved Rxs     Elias is at pharmacy and will pick these up for patient     Swati ROBERTS, Triage RN  [facility]

## 2023-02-23 NOTE — TELEPHONE ENCOUNTER
She is positive for coronavirus today. Wants RX sent in. Symptoms began last night, aches, headache, chills, fever 98.7.      COVID Positive/Requesting COVID treatment    Patient is positive for COVID and requesting treatment options.    Date of positive COVID test (PCR or at home)? 2-23-23  Current COVID symptoms: fever or chills, cough, fatigue, muscle or body aches and headache  Date COVID symptoms began: 2-22-23    Message should be routed to clinic RN pool. Best phone number to use for call back: [phone].    Calista De Jesus RN  [facility]    Reason for Disposition    [1] HIGH RISK for severe COVID complications (e.g., weak immune system, age > 64 years, obesity with BMI of 30 or higher, pregnant, chronic lung disease or other chronic medical condition) AND [2] COVID symptoms (e.g., cough, fever)  (Exceptions: Already seen by PCP and no new or worsening symptoms.)    Additional Information    Negative: SEVERE difficulty breathing (e.g., struggling for each breath, speaks in single words)    Negative: Difficult to awaken or acting confused (e.g., disoriented, slurred speech)    Negative: Bluish (or gray) lips or face now    Negative: Shock suspected (e.g., cold/pale/clammy skin, too weak to stand, low BP, rapid pulse)    Negative: Sounds like a life-threatening emergency to the triager    Negative: [1] Diagnosed or suspected COVID-19 AND [2] symptoms lasting 3 or more weeks    Negative: [1] COVID-19 exposure AND [2] no symptoms    Negative: COVID-19 vaccine reaction suspected (e.g., fever, headache, muscle aches) occurring 1 to 3 days after getting vaccine    Negative: COVID-19 vaccine, questions about    Negative: [1] Lives with someone known to have influenza (flu test positive) AND [2] flu-like symptoms (e.g., cough, runny nose, sore throat, SOB; with or without fever)    Negative: [1] Adult with possible COVID-19 symptoms AND [2] triager concerned about severity of symptoms or other  causes    Negative: COVID-19 and breastfeeding, questions about    Negative: SEVERE or constant chest pain or pressure  (Exception: Mild central chest pain, present only when coughing.)    Negative: MODERATE difficulty breathing (e.g., speaks in phrases, SOB even at rest, pulse 100-120)    Negative: Headache and stiff neck (can't touch chin to chest)     headache    Negative: Oxygen level (e.g., pulse oximetry) 90 percent or lower     98% yesterday    Negative: Chest pain or pressure  (Exception: MILD central chest pain, present only when coughing)    Negative: Patient sounds very sick or weak to the triager    Negative: MILD difficulty breathing (e.g., minimal/no SOB at rest, SOB with walking, pulse <100)    Negative: Fever > 103 F (39.4 C)    Negative: [1] Fever > 101 F (38.3 C) AND [2] over 60 years of age    Negative: [1] Fever > 100.0 F (37.8 C) AND [2] bedridden (e.g., nursing home patient, CVA, chronic illness, recovering from surgery)    Protocols used: CORONAVIRUS (COVID-19) DIAGNOSED OR [id number]

## 2023-02-24 RX ORDER — MOLNUPIRAVIR 200 MG/1
CAPSULE ORAL
Qty: 40 CAPSULE | Refills: 0 | Status: SHIPPED | OUTPATIENT
Start: 2023-02-24 | End: 2023-09-11

## 2023-02-27 ENCOUNTER — VIRTUAL VISIT (OUTPATIENT)
Dept: FAMILY MEDICINE | Facility: CLINIC | Age: 83
End: 2023-02-27
Payer: MEDICARE

## 2023-02-27 DIAGNOSIS — R53.81 MALAISE: ICD-10-CM

## 2023-02-27 DIAGNOSIS — R05.1 ACUTE COUGH: ICD-10-CM

## 2023-02-27 DIAGNOSIS — U07.1 INFECTION DUE TO 2019 NOVEL CORONAVIRUS: Primary | ICD-10-CM

## 2023-02-27 PROCEDURE — 99442 PR PHYSICIAN TELEPHONE EVALUATION 11-20 MIN: CPT | Mod: CS | Performed by: INTERNAL MEDICINE

## 2023-02-27 NOTE — PROGRESS NOTES
Marah is a 82 year old who is being evaluated via a billable telephone visit.      What phone number would you like to be contacted at? [phone]  How would you like to obtain your AVS? Chuy  Distant Location (provider location):  Off-site      Subjective   Marah is a 82 year old presenting for the following health issues:  Follow Up      History of Present Illness       Reason for visit:  [facility] see how im doing while on med for COVID  Symptom onset:  3-7 days ago  Symptoms include:  Mild achs pains. Tired  Symptom intensity:  Mild  Symptom progression:  Improving  Had these symptoms before:  No  What makes it better:  Lots of water & rest    She eats 2-3 servings of fruits and vegetables daily.She consumes 1 sweetened beverage(s) daily.She exercises with enough effort to increase her heart rate 20 to 29 minutes per day.  She exercises with enough effort to increase her heart rate 3 or less days per week.   She is taking medications regularly.       Reason for visit:  Follow up on recent Covid infection    She eats 2-3 servings of fruits and vegetables daily.She consumes 1 sweetened beverage(s) daily.She exercises with enough effort to increase her heart rate 20 to 29 minutes per day.  She exercises with enough effort to increase her heart rate 5 days per week.   She is taking medications regularly.         Current Medications:     Current Outpatient Medications   Medication Sig Dispense Refill     atorvastatin (LIPITOR) 10 MG tablet TAKE 1 TABLET EVERY DAY 90 tablet 0     dextromethorphan-guaiFENesin (TUSSIN DM)  MG/5ML liquid Take 5 mLs by mouth 4 times daily as needed (cough) 354 mL 3     guaiFENesin-dextromethorphan (ROBITUSSIN DM) 100-10 MG/5ML syrup Take 10 mLs by mouth every 4 hours as needed for cough 354 mL 3     LAGEVRIO 200 MG capsule TAKE 4 CAPSULES(800 MG) BY MOUTH EVERY 12 HOURS 40 capsule 0     levothyroxine (SYNTHROID/LEVOTHROID) 88 MCG tablet TAKE 1 TABLET EVERY  DAY 90 tablet 2     Multiple Vitamin (MULTI-VITAMIN DAILY PO) Take 1 Scoop by mouth daily       valsartan (DIOVAN) 80 MG tablet TAKE 1 TABLET EVERY DAY 90 tablet 2         Allergies:      Allergies   Allergen Reactions     No Known Allergies             Past Medical History:     Past Medical History:   Diagnosis Date     Cancer (H) 2019    Had melanoma & had surgery  on 2119     Hypertension 2015    Taking Valsartan 80 Mg 1 daily     Thyroid disease     Taking Levothyroxine 88 mg 1 day in morning         Past Surgical History:     Past Surgical History:   Procedure Laterality Date     BIOPSY      Melanoma     COLONOSCOPY       VASCULAR SURGERY  2013    Varicose vein surgery         Family Medical History:     Family History   Problem Relation Age of Onset     Hypertension Brother      Hypertension Sister      Hyperlipidemia Sister      Other Cancer Sister         CLL     Substance Abuse Sister      Substance Abuse Father          1970     Substance Abuse Brother      Substance Abuse Daughter      Substance Abuse Son          Social History:     Social History     Socioeconomic History     Marital status:      Spouse name: Not on file     Number of children: Not on file     Years of education: Not on file     Highest education level: Not on file   Occupational History     Not on file   Tobacco Use     Smoking status: Former     Packs/day: 0.50     Years: 2.00     Pack years: 1.00     Types: Cigarettes     Start date: 1961     Quit date: 1963     Years since quittin.8     Smokeless tobacco: Never   Vaping Use     Vaping Use: Never used   Substance and Sexual Activity     Alcohol use: Yes     Comment: 1 glass of wine daily     Drug use: Never     Sexual activity: Not Currently     Partners: Male   Other Topics Concern     Parent/sibling w/ CABG, MI or angioplasty before 65F 55M? No   Social History Narrative     Not on file     Social  Determinants of Health     Financial Resource Strain: Not on file   Food Insecurity: Not on file   Transportation Needs: Not on file   Physical Activity: Not on file   Stress: Not on file   Social Connections: Not on file   Intimate Partner Violence: Not on file   Housing Stability: Not on file           Review of System:     Constitutional: positive for fever or chills  Ears/Nose/Throat: positive for nasal congestion, sore throat  Respiratory: positive for cough, COVID infection  Cardiovascular: Negative for chest pain  Behavioral: Negative for tobacco use       Physical Exam:   LMP  (LMP Unknown)     RESP: cough symptoms improved from prior baseline   NEURO: Alert & Oriented x 3.   PSYCH: mentation appears normal, affect normal        Diagnostic Test Results:     Diagnostic Test Results:  Labs reviewed in Epic    ASSESSMENT/PLAN:       Marah was seen today for recheck   Acute cough  Malaise  Infection due to 2019 novel coronavirus  -     Symptoms improving  - continue: molnupiravir (LAGEVRIO) 200 MG capsule; Take 4 capsules (800 mg) by mouth every 12 hours  -     dextromethorphan-guaiFENesin (TUSSIN DM)  MG/5ML liquid; Take 5 mLs by mouth 4 times daily as needed (cough)        Follow Up Plan:     Patient is instructed to return to Internal Medicine clinic for follow-up visit in 1 week.        Arelis High MD  Internal Medicine  [facility]      telephone visit duration including chart review, medication management: 20 minutes

## 2023-02-28 LAB
BACTERIA SPEC CULT: ABNORMAL
BACTERIA SPEC CULT: ABNORMAL

## 2023-03-02 ENCOUNTER — VIRTUAL VISIT (OUTPATIENT)
Dept: FAMILY MEDICINE | Facility: CLINIC | Age: 83
End: 2023-03-02
Payer: MEDICARE

## 2023-03-02 DIAGNOSIS — R05.1 ACUTE COUGH: ICD-10-CM

## 2023-03-02 DIAGNOSIS — R53.81 MALAISE: ICD-10-CM

## 2023-03-02 DIAGNOSIS — U07.1 INFECTION DUE TO 2019 NOVEL CORONAVIRUS: Primary | ICD-10-CM

## 2023-03-02 PROCEDURE — 99442 PR PHYSICIAN TELEPHONE EVALUATION 11-20 MIN: CPT | Mod: 95 | Performed by: INTERNAL MEDICINE

## 2023-03-02 RX ORDER — LATANOPROST 50 UG/ML
SOLUTION/ DROPS OPHTHALMIC
COMMUNITY
Start: 2023-02-28

## 2023-03-02 NOTE — PROGRESS NOTES
Marah is a 82 year old who is being evaluated via a billable telephone visit.      What phone number would you like to be contacted at? [phone]  How would you like to obtain your AVS? Chuy  Distant Location (provider location):  Off-site      Subjective   Marah is a 82 year old presenting for the following health issues:  Follow Up (Covid)      History of Present Illness       Reason for visit:  Continuing Follow up call ftom Dr High to see how I'm doing while on med for COVID  Symptom onset:  3-7 days ago  Symptoms include:  Mild achs pains. Tired  Symptom intensity:  Mild  Symptom progression:  Improving  Had these symptoms before:  No  What makes it better:  Lots of water & rest    She eats 2-3 servings of fruits and vegetables daily.She consumes 1 sweetened beverage(s) daily.She exercises with enough effort to increase her heart rate 20 to 29 minutes per day.  She exercises with enough effort to increase her heart rate 3 or less days per week.   She is taking medications regularly.       Reason for visit:  Follow up on recent Covid infection    She eats 2-3 servings of fruits and vegetables daily.She consumes 1 sweetened beverage(s) daily.She exercises with enough effort to increase her heart rate 20 to 29 minutes per day.  She exercises with enough effort to increase her heart rate 5 days per week.   She is taking medications regularly.         Current Medications:     Current Outpatient Medications   Medication Sig Dispense Refill     atorvastatin (LIPITOR) 10 MG tablet TAKE 1 TABLET EVERY DAY 90 tablet 0     LAGEVRIO 200 MG capsule TAKE 4 CAPSULES(800 MG) BY MOUTH EVERY 12 HOURS 40 capsule 0     latanoprost (XALATAN) 0.005 % ophthalmic solution instill 1 drop into each eye at bedtime       levothyroxine (SYNTHROID/LEVOTHROID) 88 MCG tablet TAKE 1 TABLET EVERY DAY 90 tablet 2     Multiple Vitamin (MULTI-VITAMIN DAILY PO) Take 1 Scoop by mouth daily       valsartan (DIOVAN) 80 MG tablet TAKE 1 TABLET  EVERY DAY 90 tablet 2         Allergies:      Allergies   Allergen Reactions     No Known Allergies             Past Medical History:     Past Medical History:   Diagnosis Date     Cancer (H) 2019    Had melanoma & had surgery  on 2119     Hypertension 2015    Taking Valsartan 80 Mg 1 daily     Thyroid disease     Taking Levothyroxine 88 mg 1 day in morning         Past Surgical History:     Past Surgical History:   Procedure Laterality Date     BIOPSY      Melanoma     COLONOSCOPY       VASCULAR SURGERY  2013    Varicose vein surgery         Family Medical History:     Family History   Problem Relation Age of Onset     Hypertension Brother      Hypertension Sister      Hyperlipidemia Sister      Other Cancer Sister         CLL     Substance Abuse Sister      Substance Abuse Father          1970     Substance Abuse Brother      Substance Abuse Daughter      Substance Abuse Son          Social History:     Social History     Socioeconomic History     Marital status:      Spouse name: Not on file     Number of children: Not on file     Years of education: Not on file     Highest education level: Not on file   Occupational History     Not on file   Tobacco Use     Smoking status: Former     Packs/day: 0.50     Years: 2.00     Pack years: 1.00     Types: Cigarettes     Start date: 1961     Quit date: 1963     Years since quittin.8     Smokeless tobacco: Never   Vaping Use     Vaping Use: Never used   Substance and Sexual Activity     Alcohol use: Yes     Comment: 1 glass of wine daily     Drug use: Never     Sexual activity: Not Currently     Partners: Male   Other Topics Concern     Parent/sibling w/ CABG, MI or angioplasty before 65F 55M? No   Social History Narrative     Not on file     Social Determinants of Health     Financial Resource Strain: Not on file   Food Insecurity: Not on file   Transportation Needs: Not on file   Physical  Activity: Not on file   Stress: Not on file   Social Connections: Not on file   Intimate Partner Violence: Not on file   Housing Stability: Not on file           Review of System:     Constitutional: positive for fever or chills  Ears/Nose/Throat: positive for nasal congestion, sore throat  Respiratory: positive for cough, COVID infection  Cardiovascular: Negative for chest pain  Behavioral: Negative for tobacco use       Physical Exam:   LMP  (LMP Unknown)     RESP: cough symptoms improved from prior baseline   NEURO: Alert & Oriented x 3.   PSYCH: mentation appears normal, affect normal        Diagnostic Test Results:     Diagnostic Test Results:  Labs reviewed in Epic    ASSESSMENT/PLAN:       Marah was seen today for recheck   Acute cough  Malaise  Infection due to 2019 novel coronavirus  -     Symptoms continue to gradually improve  - continue: molnupiravir (LAGEVRIO) 200 MG capsule; Take 4 capsules (800 mg) by mouth every 12 hours  -     dextromethorphan-guaiFENesin (TUSSIN DM)  MG/5ML liquid; Take 5 mLs by mouth 4 times daily as needed (cough)        Follow Up Plan:     Patient is instructed to return to Internal Medicine clinic for follow-up visit in 1 to 3 months.        Arelis High MD  Internal Medicine  [facility]      telephone visit duration including chart review, medication management: 20 minutes

## 2023-04-03 ENCOUNTER — ANCILLARY PROCEDURE (OUTPATIENT)
Dept: CT IMAGING | Facility: CLINIC | Age: 83
End: 2023-04-03
Attending: INTERNAL MEDICINE
Payer: MEDICARE

## 2023-04-03 DIAGNOSIS — Z85.820 PERSONAL HISTORY OF MALIGNANT MELANOMA OF SKIN: ICD-10-CM

## 2023-04-03 LAB
CREAT BLD-MCNC: 0.7 MG/DL (ref 0.5–1)
GFR SERPL CREATININE-BSD FRML MDRD: >60 ML/MIN/1.73M2

## 2023-04-03 PROCEDURE — 255N000002 HC RX 255 OP 636: Performed by: INTERNAL MEDICINE

## 2023-04-03 PROCEDURE — 82565 ASSAY OF CREATININE: CPT

## 2023-04-03 PROCEDURE — 70491 CT SOFT TISSUE NECK W/DYE: CPT

## 2023-04-03 PROCEDURE — 74177 CT ABD & PELVIS W/CONTRAST: CPT

## 2023-04-03 RX ADMIN — IOHEXOL 125 ML: 350 INJECTION, SOLUTION INTRAVENOUS at 09:13

## 2023-04-05 ENCOUNTER — TRANSFERRED RECORDS (OUTPATIENT)
Dept: HEALTH INFORMATION MANAGEMENT | Facility: CLINIC | Age: 83
End: 2023-04-05
Payer: MEDICARE

## 2023-06-16 ENCOUNTER — ANCILLARY PROCEDURE (OUTPATIENT)
Dept: GENERAL RADIOLOGY | Facility: CLINIC | Age: 83
End: 2023-06-16
Attending: INTERNAL MEDICINE
Payer: MEDICARE

## 2023-06-16 ENCOUNTER — OFFICE VISIT (OUTPATIENT)
Dept: FAMILY MEDICINE | Facility: CLINIC | Age: 83
End: 2023-06-16
Payer: MEDICARE

## 2023-06-16 VITALS
WEIGHT: 163 LBS | TEMPERATURE: 97.7 F | HEART RATE: 52 BPM | OXYGEN SATURATION: 98 % | SYSTOLIC BLOOD PRESSURE: 134 MMHG | BODY MASS INDEX: 27.16 KG/M2 | RESPIRATION RATE: 16 BRPM | DIASTOLIC BLOOD PRESSURE: 78 MMHG | HEIGHT: 65 IN

## 2023-06-16 DIAGNOSIS — M79.672 LEFT FOOT PAIN: Primary | ICD-10-CM

## 2023-06-16 DIAGNOSIS — E78.5 HYPERLIPIDEMIA LDL GOAL <100: ICD-10-CM

## 2023-06-16 DIAGNOSIS — E03.9 HYPOTHYROIDISM, UNSPECIFIED TYPE: ICD-10-CM

## 2023-06-16 DIAGNOSIS — H00.011 HORDEOLUM EXTERNUM OF RIGHT UPPER EYELID: ICD-10-CM

## 2023-06-16 DIAGNOSIS — M79.672 LEFT FOOT PAIN: ICD-10-CM

## 2023-06-16 PROCEDURE — 99214 OFFICE O/P EST MOD 30 MIN: CPT | Performed by: INTERNAL MEDICINE

## 2023-06-16 PROCEDURE — 73630 X-RAY EXAM OF FOOT: CPT | Mod: TC | Performed by: RADIOLOGY

## 2023-06-16 RX ORDER — NEOMYCIN SULFATE, POLYMYXIN B SULFATE, AND DEXAMETHASONE 3.5; 10000; 1 MG/G; [USP'U]/G; MG/G
0.5 OINTMENT OPHTHALMIC PRN
COMMUNITY
Start: 2023-06-15

## 2023-06-16 RX ORDER — KETOCONAZOLE 20 MG/ML
SHAMPOO TOPICAL DAILY PRN
COMMUNITY
Start: 2023-05-19

## 2023-06-16 ASSESSMENT — PAIN SCALES - GENERAL: PAINLEVEL: EXTREME PAIN (8)

## 2023-06-16 ASSESSMENT — ENCOUNTER SYMPTOMS: EYE PAIN: 1

## 2023-06-16 NOTE — PROGRESS NOTES
Ovidio Crystal is a 83 year old, presenting for the following health issues:  Eye Problem and Foot Problems      Eye Problem     History of Present Illness       Reason for visit:  Sty on upper right lid & pain in bottom of left foot behind toes  Symptom onset:  3-7 days ago  Symptoms include:  Sore when i walk  Symptom intensity:  Severe  Symptom progression:  Staying the same  Had these symptoms before:  No  What makes it worse:  Yes. When i need to walk  What makes it better:  Elevating leg    She eats 2-3 servings of fruits and vegetables daily.She consumes 0 sweetened beverage(s) daily.She exercises with enough effort to increase her heart rate 30 to 60 minutes per day.  She exercises with enough effort to increase her heart rate 4 days per week.   She is taking medications regularly.        Current Medications:     Current Outpatient Medications   Medication Sig Dispense Refill     atorvastatin (LIPITOR) 10 MG tablet TAKE 1 TABLET EVERY DAY 90 tablet 0     ketoconazole (NIZORAL) 2 % external shampoo USE TO SHAMPOO THREE TIMES A WEEK       LAGEVRIO 200 MG capsule TAKE 4 CAPSULES(800 MG) BY MOUTH EVERY 12 HOURS 40 capsule 0     latanoprost (XALATAN) 0.005 % ophthalmic solution instill 1 drop into each eye at bedtime       levothyroxine (SYNTHROID/LEVOTHROID) 88 MCG tablet TAKE 1 TABLET EVERY DAY 90 tablet 2     neomycin-polymyxin-dexamethasone (MAXITROL) 3.5-17835-2.1 ophthalmic ointment        valsartan (DIOVAN) 80 MG tablet TAKE 1 TABLET EVERY DAY 90 tablet 2         Allergies:      Allergies   Allergen Reactions     No Known Allergies             Past Medical History:     Past Medical History:   Diagnosis Date     Cancer (H) December 2019    Had melanoma & had surgery  on December 18 2119     Hypertension January 2015    Taking Valsartan 80 Mg 1 daily     Thyroid disease 2000    Taking Levothyroxine 88 mg 1 day in morning         Past Surgical History:     Past Surgical History:   Procedure Laterality  Date     BIOPSY  2019    Melanoma     COLONOSCOPY  2019     VASCULAR SURGERY  2013    Varicose vein surgery         Family Medical History:     Family History   Problem Relation Age of Onset     Hypertension Brother      Hypertension Sister      Hyperlipidemia Sister      Other Cancer Sister         CLL     Substance Abuse Sister      Substance Abuse Father          1970     Substance Abuse Brother      Substance Abuse Daughter      Substance Abuse Son          Social History:     Social History     Socioeconomic History     Marital status:      Spouse name: Not on file     Number of children: Not on file     Years of education: Not on file     Highest education level: Not on file   Occupational History     Not on file   Tobacco Use     Smoking status: Former     Packs/day: 0.50     Years: 2.00     Pack years: 1.00     Types: Cigarettes     Start date: 1961     Quit date: 1963     Years since quittin.1     Smokeless tobacco: Never   Vaping Use     Vaping Use: Never used   Substance and Sexual Activity     Alcohol use: Yes     Comment: 1 glass of wine daily     Drug use: Never     Sexual activity: Not Currently     Partners: Male   Other Topics Concern     Parent/sibling w/ CABG, MI or angioplasty before 65F 55M? No   Social History Narrative     Not on file     Social Determinants of Health     Financial Resource Strain: Not on file   Food Insecurity: Not on file   Transportation Needs: Not on file   Physical Activity: Not on file   Stress: Not on file   Social Connections: Not on file   Intimate Partner Violence: Not on file   Housing Stability: Not on file           Review of System:     Constitutional: Negative for fever or chills  Skin: Negative for rashes  Eyes: Positive for recent sty symptoms  Ears/Nose/Throat: Negative for nasal congestion, sore throat  Respiratory: No shortness of breath, dyspnea on exertion, cough, or hemoptysis  Cardiovascular: Negative for chest  "pain  Gastrointestinal: Negative for nausea, vomiting  Genitourinary: Negative for dysuria, hematuria  Musculoskeletal: positive for foot pains  Neurologic: Negative for headaches  Psychiatric: Negative for depression, anxiety  Hematologic/Lymphatic/Immunologic: Negative  Endocrine: Negative  Behavioral: Negative for tobacco use       Physical Exam:   /78 (BP Location: Right arm, Patient Position: Sitting, Cuff Size: Adult Regular)   Pulse 52   Temp 97.7  F (36.5  C) (Oral)   Resp 16   Ht 1.643 m (5' 4.69\")   Wt 73.9 kg (163 lb)   LMP  (LMP Unknown)   SpO2 98%   Breastfeeding No   BMI 27.39 kg/m      GENERAL: alert and no distress  EYES: eyes grossly normal to inspection, and conjunctivae and sclerae normal, right eyelid sty  HENT: Normocephalic atraumatic. Nose and mouth without ulcers or lesions  NECK: supple  RESP: lungs clear to auscultation   CV: regular rate and rhythm, normal S1 S2  LYMPH: no peripheral edema   ABDOMEN: nondistended  MS: left foot pains noted  SKIN: no suspicious lesions or rashes  NEURO: Alert & Oriented x 3.   PSYCH: mentation appears normal, affect normal        Diagnostic Test Results:     Diagnostic Test Results:  Labs reviewed in Epic  Results for orders placed or performed in visit on 06/16/23   XR Foot Left G/E 3 Views     Status: None    Narrative    XR FOOT LEFT G/E 3 VIEWS 6/16/2023 3:17 PM     HISTORY: Left foot pain    COMPARISON: None.       Impression    IMPRESSION:   Well-formed periosteal bone formation along the metatarsal shafts  most pronounced at  the fourth metatarsal, where there is some  asymmetrically lateral thickening of the cortex. These findings are  chronic in appearance. There is no evidence of an acute fracture. Mild  scattered arthritic changes of the foot most pronounced at the first  MTP joint. Calcaneal spur. Mild hallux valgus.    MEDHAT OLSON MD         SYSTEM ID:  [id number]         ASSESSMENT/PLAN:       Marah was seen today for foot " pain.    Diagnoses and all orders for this visit:    Left foot pain  -     Orthopedic  Referral; Future  -     XR Foot Left G/E 3 Views; Future    Hordeolum externum of right upper eyelid  - continue warm compresses, outpatient eye clinic follow up going forward    Hyperlipidemia LDL goal <100  - stable, continue current therapy    Hypothyroidism, unspecified type  - stable, continue current therapy    Follow Up Plan:     Patient is instructed to return to Internal Medicine clinic for follow-up visit in 1 month.        Arelis High MD  Internal Medicine  [facility]

## 2023-06-21 ENCOUNTER — OFFICE VISIT (OUTPATIENT)
Dept: PODIATRY | Facility: CLINIC | Age: 83
End: 2023-06-21
Attending: INTERNAL MEDICINE
Payer: MEDICARE

## 2023-06-21 VITALS — SYSTOLIC BLOOD PRESSURE: 128 MMHG | BODY MASS INDEX: 27.39 KG/M2 | WEIGHT: 163 LBS | DIASTOLIC BLOOD PRESSURE: 72 MMHG

## 2023-06-21 DIAGNOSIS — M79.672 LEFT FOOT PAIN: ICD-10-CM

## 2023-06-21 DIAGNOSIS — M77.42 METATARSALGIA OF LEFT FOOT: Primary | ICD-10-CM

## 2023-06-21 DIAGNOSIS — M21.41 PES PLANUS OF BOTH FEET: ICD-10-CM

## 2023-06-21 DIAGNOSIS — M21.42 PES PLANUS OF BOTH FEET: ICD-10-CM

## 2023-06-21 DIAGNOSIS — M20.5X2 HALLUX LIMITUS, LEFT: ICD-10-CM

## 2023-06-21 PROCEDURE — 99203 OFFICE O/P NEW LOW 30 MIN: CPT | Performed by: PODIATRIST

## 2023-06-21 NOTE — NURSING NOTE
"Chief Complaint   Patient presents with     Left Foot - Foot Pain     Pain on the bottom of the foot- Aching, throbbing,  shooting. Pain comes and goes.      Vital signs:      BP: 128/72               Weight: 73.9 kg (163 lb)  Estimated body mass index is 27.39 kg/m  as calculated from the following:    Height as of 6/16/23: 1.643 m (5' 4.69\").    Weight as of this encounter: 73.9 kg (163 lb).      Elizabeth Covington MA  [facility]  "

## 2023-06-21 NOTE — PROGRESS NOTES
"ASSESSMENT:  Encounter Diagnoses   Name Primary?     Metatarsalgia of left foot Yes     Hallux limitus, left      Pes planus of both feet      Left foot pain      MEDICAL DECISION MAKING:  Marah's problem is most consistent with metatarsalgia or more specifically left second metatarsal phalangeal joint capsulitis.  I explained that this can be related to her abducted first metatarsal, pes planus, hallux limitus, lateral transfer of weight and atrophy of the fat padding.    I personally reviewed the left foot x-ray images with her.  No acute findings.  Notable first metatarsophalangeal joint degenerative changes.    FOREFOOT PAIN RECOMMENDATIONS    1) Please consider stiffer/ rigid - soled shoes as much as possible. These take stress off of the ball of your foot.    2) Avoid barefoot walking, walking in socks, flexible shoes and flip flops.    3) It is a good idea to wear a shoe at all times, including when at home.    4) Consider purchasing a felt metatarsal pad.  A good brand is \"Hapad.\"  You can find these and others online.  Also, these can be built into custom/prescription arch supports.    5) Consider a quality over-the-counter arch support. These can be found at [employer].  If Dr. Rivers prescribed custom orthoses, please consider this option.    6) Ice and anti inflammatories can be helpful, earlier on in the process.  Anti inflammatories are not good for you, if take for a long period of time.       Disclaimer: This note consists of symbols derived from keyboarding, dictation and/or voice recognition software. As a result, there may be errors in the script that have gone undetected. Please consider this when interpreting information found in this chart.    Gokul Rivers, OBED, FACFAS, MS    [facility]le [facility]      ____________________________________________________________________    HPI:       I was asked by Dr. High to evaluate Marah for left foot pain.  Chief Complaint: " pain in bottom of left foot  Onset of problem: 2 weeks  Pain/ discomfort is described as:  Aching, throbbing  Pain Ratin/10   Frequency:  Comes and goes  The pain is exacerbated by weight bearing  Previous treatment: Tylenol  Exercise: walking and balance exercises  *  Past Medical History:   Diagnosis Date     Cancer (H) 2019    Had melanoma & had surgery  on 2119     Hypertension 2015    Taking Valsartan 80 Mg 1 daily     Thyroid disease     Taking Levothyroxine 88 mg 1 day in morning   *  *  Past Surgical History:   Procedure Laterality Date     BIOPSY      Melanoma     COLONOSCOPY       VASCULAR SURGERY  2013    Varicose vein surgery   *  *  Current Outpatient Medications   Medication Sig Dispense Refill     atorvastatin (LIPITOR) 10 MG tablet TAKE 1 TABLET EVERY DAY 90 tablet 0     ketoconazole (NIZORAL) 2 % external shampoo USE TO SHAMPOO THREE TIMES A WEEK       LAGEVRIO 200 MG capsule TAKE 4 CAPSULES(800 MG) BY MOUTH EVERY 12 HOURS 40 capsule 0     latanoprost (XALATAN) 0.005 % ophthalmic solution instill 1 drop into each eye at bedtime       levothyroxine (SYNTHROID/LEVOTHROID) 88 MCG tablet TAKE 1 TABLET EVERY DAY 90 tablet 2     neomycin-polymyxin-dexamethasone (MAXITROL) 3.5-23107-0.1 ophthalmic ointment        valsartan (DIOVAN) 80 MG tablet TAKE 1 TABLET EVERY DAY 90 tablet 2         EXAM:    Vitals: /72   Wt 73.9 kg (163 lb)   LMP  (LMP Unknown)   BMI 27.39 kg/m    BMI: Body mass index is 27.39 kg/m .    Constitutional:  Marah Mccormick is in no apparent distress, appears well-nourished.  Cooperative with history and physical exam.    Vascular:  Pedal pulses are palpable for both the DP and PT arteries.  CFT < 3 sec.  No pedal edema.    Multiple varicosities bilateral lower extremity.    Neuro: Light touch sensation is intact to the L4, L5, S1 distributions  No evidence of weakness, spasticity, or contracture in the lower extremities.      Derm: Normal texture and turgor.  No erythema, ecchymosis, or cyanosis.  No open lesions.     Musculoskeletal:    Lower extremity muscle strength is normal.  Decrease in medial longitudinal arch with weightbearing.  With loading of the left forefoot, limited first metatarsophalangeal joint dorsiflexion.  Abduction of the hallux.  There is no second toe deformity.    Pain on palpation to the plantar aspect of the left second metatarsophalangeal joint.    XR FOOT LEFT G/E 3 VIEWS 6/16/2023 3:17 PM      HISTORY: Left foot pain     COMPARISON: None.                                                                   IMPRESSION:   Well-formed periosteal bone formation along the metatarsal shafts  most pronounced at  the fourth metatarsal, where there is some  asymmetrically lateral thickening of the cortex. These findings are  chronic in appearance. There is no evidence of an acute fracture. Mild  scattered arthritic changes of the foot most pronounced at the first  MTP joint. Calcaneal spur. Mild hallux valgus.     MEDHAT OLSON MD

## 2023-06-21 NOTE — PATIENT INSTRUCTIONS
Thank you for choosing [facility] Podiatry / Foot & Ankle Surgery!    DR. HARRIS'S CLINIC LOCATIONS:     [facility] TRIAGE LINE: [phone]   [address] [place] [address] APPOINTMENTS: [phone]   [place] [address] RADIOLOGY: [phone]   (Every other Tues - Wed - Fri PM) SET UP SURGERY: [phone]    PHYSICAL THERAPY: [phone]   [place] SPECIALTY BILLING QUESTIONS: [phone] [address] FAX: [phone]   [place] [address]    (Thurs & Fri AM)       CAPSULITIS / METATARSALGIA  All joints in the body are surrounded by a capsule, or a covering of soft tissue and ligaments. The capsule holds bones together and secretes joint fluid to help lubricate the joint. If a joint capsule is exposed to excessive force, it can develop microscopic tears and become inflamed. This commonly occurs in the foot due to mild variation in anatomy. Hammertoes, bunions, irregular bone length, joint immobility, etc. can all lead to excessive force on the joint. Capsule injury can also occur due to repetitive stress from exercise, insufficient support from shoes, excessive bare foot walking and excessive weight.      Conservative treatments include ice, rest from the aggravating activity, weight loss, orthotic inserts, improving shoes and shoe modifications. You can purchase an over the counter felt metatarsal pad to place in your shoes at [facility] or on [facility]. Appropriate shoes will protect the inflamed tissue improving the chances of healing. Avoidance of standing or walking barefoot, including around the house, is necessary to allow healing. Casts are sometimes used for more aggressive protection.  NSAIDs such as Advil are also used to help with pain and decreasing inflammation. If pain continues over a period of weeks with continuous rest and icing, Corticosteroid injections can be a treatment option to try and help decrease inflammation.    Surgery is often necessary to correct the underlying  "structural problem. Surgery might include shortening an excessively long bone, repairing bunion or hammertoe, lengthening a tight Achilles  tendon, etc. These are same day surgeries that might be pursued if more conservative measures fail to provide relief.      The inflamed joint capsule has the potential to completely tear. This will allow the toe to drift off the ground, curving toward the other toes. The involved toe may under or overlap the adjacent toes as drift continues. The pain may improve after the joint tears or this new position will be permanent. Surgery can address the toe alignment. Your goal of treating capsulitis is to avoid this scenario.        ** You can find felt metatarsal pads to place in your shoes at our [facility], [employer], [employer] or on [employer]     Dr. Rivers likes the Hapad brand.    FOREFOOT PAIN RECOMMENDATIONS    1) Please consider stiffer/ rigid - soled shoes as much as possible. These take stress off of the ball of your foot. This might be short term, until pain resolves, or long term to keep pain controlled.    2) Avoid barefoot walking, walking in socks, flexible shoes, flip flops, shoes without arch support, etc.    3) It is a good idea to wear a shoe at all times, including when at home.    4) Consider purchasing a felt metatarsal pad.  A good brand is \"Hapad.\"  You can find these and others online.  Also, these can be built into custom/prescription arch supports.    5) Consider a quality over-the-counter arch support. These can be found at [employer]  If Dr. Rivers prescribed custom orthoses, please consider this option.    6) Ice and anti inflammatories can be helpful, earlier on in the process.  Anti inflammatories are not good for you, if taken for a long period of time.     7) Gentle calf stretching can take pressure off of the ball of your foot.    Calf/Achilles Stretching: Lay on you back and raise one foot, then point your toes towards the " floor. See photo below:               Hold each stretch for 30 seconds. Stretch 10 times per set, three sets per day. Morning, afternoon and evening. If your heel pain is very severe in the morning, consider doing the first set of stretches before you get out of bed.      [employer] LOCATIONS  [place]  [address] [facility]  [phone]   [address]  [phone] [place]  [address] [facility]  [phone]   [address]  [phone]   [place]  [address] [facility]  [phone] [place] [address]  [phone]   [place]  [address]  [phone]   [place]  [address]  [phone] [place]  [address]  [phone]

## 2023-06-21 NOTE — LETTER
"    6/21/2023         RE: Marah Mccormick  [address] [place] [id number]        Dear Colleague,    Thank you for referring your patient, Marah Mccormick, to the [facility]. Please see a copy of my visit note below.    ASSESSMENT:  Encounter Diagnoses   Name Primary?     Metatarsalgia of left foot Yes     Hallux limitus, left      Pes planus of both feet      Left foot pain      MEDICAL DECISION MAKING:  Marah's problem is most consistent with metatarsalgia or more specifically left second metatarsal phalangeal joint capsulitis.  I explained that this can be related to her abducted first metatarsal, pes planus, hallux limitus, lateral transfer of weight and atrophy of the fat padding.    I personally reviewed the left foot x-ray images with her.  No acute findings.  Notable first metatarsophalangeal joint degenerative changes.    FOREFOOT PAIN RECOMMENDATIONS    1) Please consider stiffer/ rigid - soled shoes as much as possible. These take stress off of the ball of your foot.    2) Avoid barefoot walking, walking in socks, flexible shoes and flip flops.    3) It is a good idea to wear a shoe at all times, including when at home.    4) Consider purchasing a felt metatarsal pad.  A good brand is \"Hapad.\"  You can find these and others online.  Also, these can be built into custom/prescription arch supports.    5) Consider a quality over-the-counter arch support. These can be found at [employer].  If Dr. Rivers prescribed custom orthoses, please consider this option.    6) Ice and anti inflammatories can be helpful, earlier on in the process.  Anti inflammatories are not good for you, if take for a long period of time.       Disclaimer: This note consists of symbols derived from keyboarding, dictation and/or voice recognition software. As a result, there may be errors in the script that have gone undetected. Please consider this when interpreting information found in " this chart.    Gokul Rivers DPM, FACFAS, MS    [facility]      ____________________________________________________________________    HPI:       I was asked by Dr. High to evaluate Marah for left foot pain.  Chief Complaint: pain in bottom of left foot  Onset of problem: 2 weeks  Pain/ discomfort is described as:  Aching, throbbing  Pain Ratin/10   Frequency:  Comes and goes  The pain is exacerbated by weight bearing  Previous treatment: Tylenol  Exercise: walking and balance exercises  *  Past Medical History:   Diagnosis Date     Cancer (H) 2019    Had melanoma & had surgery  on 2119     Hypertension 2015    Taking Valsartan 80 Mg 1 daily     Thyroid disease     Taking Levothyroxine 88 mg 1 day in morning   *  *  Past Surgical History:   Procedure Laterality Date     BIOPSY      Melanoma     COLONOSCOPY       VASCULAR SURGERY  2013    Varicose vein surgery   *  *  Current Outpatient Medications   Medication Sig Dispense Refill     atorvastatin (LIPITOR) 10 MG tablet TAKE 1 TABLET EVERY DAY 90 tablet 0     ketoconazole (NIZORAL) 2 % external shampoo USE TO SHAMPOO THREE TIMES A WEEK       LAGEVRIO 200 MG capsule TAKE 4 CAPSULES(800 MG) BY MOUTH EVERY 12 HOURS 40 capsule 0     latanoprost (XALATAN) 0.005 % ophthalmic solution instill 1 drop into each eye at bedtime       levothyroxine (SYNTHROID/LEVOTHROID) 88 MCG tablet TAKE 1 TABLET EVERY DAY 90 tablet 2     neomycin-polymyxin-dexamethasone (MAXITROL) 3.5-59760-0.1 ophthalmic ointment        valsartan (DIOVAN) 80 MG tablet TAKE 1 TABLET EVERY DAY 90 tablet 2         EXAM:    Vitals: /72   Wt 73.9 kg (163 lb)   LMP  (LMP Unknown)   BMI 27.39 kg/m    BMI: Body mass index is 27.39 kg/m .    Constitutional:  Marah Mccormick is in no apparent distress, appears well-nourished.  Cooperative with history and physical exam.    Vascular:  Pedal pulses are palpable for  both the DP and PT arteries.  CFT < 3 sec.  No pedal edema.    Multiple varicosities bilateral lower extremity.    Neuro: Light touch sensation is intact to the L4, L5, S1 distributions  No evidence of weakness, spasticity, or contracture in the lower extremities.     Derm: Normal texture and turgor.  No erythema, ecchymosis, or cyanosis.  No open lesions.     Musculoskeletal:    Lower extremity muscle strength is normal.  Decrease in medial longitudinal arch with weightbearing.  With loading of the left forefoot, limited first metatarsophalangeal joint dorsiflexion.  Abduction of the hallux.  There is no second toe deformity.    Pain on palpation to the plantar aspect of the left second metatarsophalangeal joint.    XR FOOT LEFT G/E 3 VIEWS 6/16/2023 3:17 PM      HISTORY: Left foot pain     COMPARISON: None.                                                                   IMPRESSION:   Well-formed periosteal bone formation along the metatarsal shafts  most pronounced at  the fourth metatarsal, where there is some  asymmetrically lateral thickening of the cortex. These findings are  chronic in appearance. There is no evidence of an acute fracture. Mild  scattered arthritic changes of the foot most pronounced at the first  MTP joint. Calcaneal spur. Mild hallux valgus.     MEDHAT OLSON MD           Again, thank you for allowing me to participate in the care of your patient.        Sincerely,        Gokul Rivers DPM

## 2023-08-15 ENCOUNTER — HOSPITAL ENCOUNTER (OUTPATIENT)
Dept: MAMMOGRAPHY | Facility: CLINIC | Age: 83
Discharge: HOME | End: 2023-08-15
Attending: INTERNAL MEDICINE | Admitting: INTERNAL MEDICINE
Payer: MEDICARE

## 2023-08-15 DIAGNOSIS — Z12.31 VISIT FOR SCREENING MAMMOGRAM: ICD-10-CM

## 2023-08-15 PROCEDURE — 77067 SCR MAMMO BI INCL CAD: CPT

## 2023-08-24 ENCOUNTER — TELEPHONE (OUTPATIENT)
Dept: SURGERY | Facility: CLINIC | Age: 83
End: 2023-08-24

## 2023-08-24 ENCOUNTER — OFFICE VISIT (OUTPATIENT)
Dept: SURGERY | Facility: CLINIC | Age: 83
End: 2023-08-24
Payer: MEDICARE

## 2023-08-24 VITALS
SYSTOLIC BLOOD PRESSURE: 128 MMHG | HEART RATE: 60 BPM | BODY MASS INDEX: 27.83 KG/M2 | RESPIRATION RATE: 16 BRPM | HEIGHT: 64 IN | OXYGEN SATURATION: 97 % | WEIGHT: 163 LBS | DIASTOLIC BLOOD PRESSURE: 82 MMHG

## 2023-08-24 DIAGNOSIS — K43.9 SPIGELIAN HERNIA: Primary | ICD-10-CM

## 2023-08-24 PROCEDURE — 99213 OFFICE O/P EST LOW 20 MIN: CPT | Performed by: SURGERY

## 2023-08-24 NOTE — LETTER
August 25, 2023          Arelis High MD  [address] [facility] [address]  [place] [address]      RE:   Marah Mccormick 1940      Dear Colleague,    Thank you for referring your patient, Marah Mccormick, to [facility] at [place] location. Please see a copy of my visit note below.     Marah Mccormick is a 83 year old female who presented with a vague bulge and fullness in the left lower quadrant.  Patient saw me in clinic approximately 1 year ago with this complaint.  There was some question as to whether this bulge along the abdominal wall represented a hernia or a lipoma.  We sent her for CT scan which confirmed a bowel containing spigelian hernia.  The hernia was asymptomatic at the time and the patient decided not to proceed with surgery.  The bulge has gotten significantly larger over the ensuing year and the patient is now interested in further surgical discussion.     PMH:  Marah Mccormick  has a past medical history of Cancer (H) (December 2019), Hypertension (January 2015), and Thyroid disease (2000).  PSH:  Marah Mccormick  has a past surgical history that includes biopsy (2019); colonoscopy (2019); and vascular surgery (October 2013).     Home medications and allergies reviewed.     Social History:  Marah Mccormick  reports that she quit smoking about 60 years ago. Her smoking use included cigarettes. She started smoking about 62 years ago. She has a 1.00 pack-year smoking history. She has never used smokeless tobacco. She reports current alcohol use. She reports that she does not use drugs.  Family History:  Marah Mccormick family history includes Hyperlipidemia in her sister; Hypertension in her brother and sister; Other Cancer in her sister; Substance Abuse in her brother, daughter, father, sister, and son.     ROS:  The 10 point Review of Systems is negative other than noted in the HPI.  No nausea or vomiting.  No obstructive symptoms.     Physical Exam:  Blood pressure 128/82, pulse 60, resp.  "rate 16, height 1.626 m (5' 4\"), weight 73.9 kg (163 lb), SpO2 97 %, not currently breastfeeding.  163 lbs 0 oz  Pleasant healthy older female in no distress.  Patient has a pleasant affect and communicates well.   Pupils equal round and reactive to light.   No cervical lymphadenopathy or thyromegaly.   Lung fields clear, breathing comfortably.   Heart normal sinus rhythm.  No murmurs rubs or gallops.  Abdomen soft, nontender, nondistended.  Easily palpable left lower abdominal bulge, measures approximately 6 x 10 cm.  Mildly tender but reducible.  No surrounding surgical scars.  Skin warm, dry.  No obvious rashes or lesions.     All new lab and imaging data was reviewed.  This includes CT scan from initial presentation.      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  This does contain bowel but is reducible and is not causing her obstructive symptoms.  Despite this, the bulge has increased in size and the patient is now interested in repair.  I have recommended a robot-assisted ventral hernia repair with mesh.  This should be able to be done as an outpatient.  Patient has no history of previous abdominal surgeries and does not take blood thinners.  We will get the surgery scheduled at her convenience.  Surgical co-morbities include hypertension, hyperlipidemia, hypothyroidism.       Again, thank you for allowing me to participate in the care of your patient.      Sincerely,      Gokul Lujan MD        "

## 2023-08-24 NOTE — TELEPHONE ENCOUNTER
Type of surgery: robot assisted ventral hernia repair with mesh  Location of surgery: [facility]  Date and time of surgery: 9/22/23 11:00am  Surgeon: Dr Lujan  Pre-Op Appt Date: pt to schedule  Post-Op Appt Date: pt to schedule   Packet sent out: Yes  Pre-cert/Authorization completed:  Not Applicable  Date: 8/24/23

## 2023-08-24 NOTE — PROGRESS NOTES
Surgery Consultation, [facility] JUAN PABLO Lujan MD, MD    Marah Mccormick MRN# [id number]   YOB: 1940 Age: 83 year old     PCP:  Arelis High [phone]    Chief Complaint: Enlarging abdominal wall hernia    Pt was seen in consultation from Arelis High.    History of Present Illness:  Marah Mccormick is a 83 year old female who presented with a vague bulge and fullness in the left lower quadrant.  Patient saw me in clinic approximately 1 year ago with this complaint.  There was some question as to whether this bulge along the abdominal wall represented a hernia or a lipoma.  We sent her for CT scan which confirmed a bowel containing spigelian hernia.  The hernia was asymptomatic at the time and the patient decided not to proceed with surgery.  The bulge has gotten significantly larger over the ensuing year and the patient is now interested in further surgical discussion.    PMH:  Marah Mccormick  has a past medical history of Cancer (H) (December 2019), Hypertension (January 2015), and Thyroid disease (2000).  PSH:  Marah Mccormick  has a past surgical history that includes biopsy (2019); colonoscopy (2019); and vascular surgery (October 2013).    Home medications and allergies reviewed.    Social History:  Marah Mccormick  reports that she quit smoking about 60 years ago. Her smoking use included cigarettes. She started smoking about 62 years ago. She has a 1.00 pack-year smoking history. She has never used smokeless tobacco. She reports current alcohol use. She reports that she does not use drugs.  Family History:  Marah Mccormick family history includes Hyperlipidemia in her sister; Hypertension in her brother and sister; Other Cancer in her sister; Substance Abuse in her brother, daughter, father, sister, and son.    ROS:  The 10 point Review of Systems is negative other than noted in the HPI.  No nausea or vomiting.  No obstructive symptoms.    Physical  "Exam:  Blood pressure 128/82, pulse 60, resp. rate 16, height 1.626 m (5' 4\"), weight 73.9 kg (163 lb), SpO2 97 %, not currently breastfeeding.  163 lbs 0 oz  Pleasant healthy older female in no distress.  Patient has a pleasant affect and communicates well.   Pupils equal round and reactive to light.   No cervical lymphadenopathy or thyromegaly.   Lung fields clear, breathing comfortably.   Heart normal sinus rhythm.  No murmurs rubs or gallops.  Abdomen soft, nontender, nondistended.  Easily palpable left lower abdominal bulge, measures approximately 6 x 10 cm.  Mildly tender but reducible.  No surrounding surgical scars.  Skin warm, dry.  No obvious rashes or lesions.    All new lab and imaging data was reviewed.  This includes CT scan from initial presentation.     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  This does contain bowel but is reducible and is not causing her obstructive symptoms.  Despite this, the bulge has increased in size and the patient is now interested in repair.  I have recommended a robot-assisted ventral hernia repair with mesh.  This should be able to be done as an outpatient.  Patient has no history of previous abdominal surgeries and does not take blood thinners.  We will get the surgery scheduled at her convenience.  Surgical co-morbities include hypertension, hyperlipidemia, hypothyroidism.    Maykel Lujan M.D.  [facility]  [phone]    Please route or send letter to:  Primary Care Provider (PCP) and Referring Provider  "

## 2023-09-12 PROBLEM — D75.1 ERYTHROCYTOSIS: Status: ACTIVE | Noted: 2023-09-12

## 2023-09-12 PROBLEM — Z85.820 HISTORY OF MALIGNANT MELANOMA OF SKIN: Status: ACTIVE | Noted: 2023-09-12

## 2023-09-12 NOTE — H&P (VIEW-ONLY)
[address], [address]  [facility] [address]  Phone: [phone]  Primary Provider: Fawn High  Pre-op Performing Provider: FAWN HIGH      PREOPERATIVE EVALUATION:  Today's date: 9/13/2023    Marah Mccormick is a 83 year old female who presents for a preoperative evaluation.      Surgical Information:  Surgery/Procedure:   robot assisted ventral hernia repair with mesh Left     Surgery Location: [facility]  Surgeon: Gokul Lujan MD   Surgery Date: 9/22/23  Time of Surgery: 11:00AM  Where patient plans to recover: At home with family  Fax number for surgical facility: Note does not need to be faxed, will be available electronically in Epic.    Assessment & Plan     The proposed surgical procedure is considered INTERMEDIATE risk.  Marah was seen today for pre-op exam.    Diagnoses and all orders for this visit:    Pre-operative cardiovascular examination    Ventral hernia without obstruction or gangrene  -     Hemoglobin; Future  -     Platelet count; Future                  - No identified additional risk factors other than previously addressed    Antiplatelet or Anticoagulation Medication Instructions:   - Patient is on no antiplatelet or anticoagulation medications.    Additional Medication Instructions:  Patient is to take all scheduled medications on the day of surgery    RECOMMENDATION:  APPROVAL GIVEN to proceed with proposed procedure, without further diagnostic evaluation.      Subjective       HPI related to upcoming procedure: patient Marah Mccormick is a very pleasant 83 year old female with ventral hernia who presents to internal medicine clinic for a pre op cardiac evaluation for upcoming obot assisted ventral hernia repair with mesh surgery procedure. No chest pain, headaches, fever or chills.        9/6/2023     3:24 PM   Preop Questions   1. Have you ever had a heart attack or stroke? No   2. Have you ever had surgery on your heart  or blood vessels, such as a stent placement, a coronary artery bypass, or surgery on an artery in your head, neck, heart, or legs? No   3. Do you have chest pain with activity? No   4. Do you have a history of  heart failure? No   5. Do you currently have a cold, bronchitis or symptoms of other infection? No   6. Do you have a cough, shortness of breath, or wheezing? No   7. Do you or anyone in your family have previous history of blood clots? No   8. Do you or does anyone in your family have a serious bleeding problem such as prolonged bleeding following surgeries or cuts? No   9. Have you ever had problems with anemia or been told to take iron pills? No   10. Have you had any abnormal blood loss such as black, tarry or bloody stools, or abnormal vaginal bleeding? No   11. Have you ever had a blood transfusion? No   12. Are you willing to have a blood transfusion if it is medically needed before, during, or after your surgery? Yes   13. Have you or any of your relatives ever had problems with anesthesia? No   14. Do you have sleep apnea, excessive snoring or daytime drowsiness? No   15. Do you have any artifical heart valves or other implanted medical devices like a pacemaker, defibrillator, or continuous glucose monitor? No   16. Do you have artificial joints? No   17. Are you allergic to latex? No       Health Care Directive:  Patient does not have a Health Care Directive or Living Will: Patient states has Advance Directive and will bring in a copy to clinic.      Review of Systems  Constitutional, neuro, ENT, endocrine, pulmonary, cardiac, gastrointestinal, genitourinary, musculoskeletal, integument and psychiatric systems are negative, except as otherwise noted.    Patient Active Problem List    Diagnosis Date Noted    History of malignant melanoma of skin 09/12/2023     Priority: Medium    Erythrocytosis 09/12/2023     Priority: Medium    Melanoma of scalp (H) 10/13/2022     Priority: Medium    Spigelian  hernia 10/25/2021     Priority: Medium    Hyperlipidemia LDL goal <100 10/22/2021     Priority: Medium    Hypothyroidism, unspecified type 10/22/2021     Priority: Medium      Past Medical History:   Diagnosis Date    Cancer (H) 2019    Had melanoma & had surgery  on 2119    Hypertension 2015    Taking Valsartan 80 Mg 1 daily    Thyroid disease     Taking Levothyroxine 88 mg 1 day in morning     Past Surgical History:   Procedure Laterality Date    BIOPSY      Melanoma    COLONOSCOPY      VASCULAR SURGERY  2013    Varicose vein surgery     Current Outpatient Medications   Medication Sig Dispense Refill    atorvastatin (LIPITOR) 10 MG tablet TAKE 1 TABLET EVERY DAY 90 tablet 0    ketoconazole (NIZORAL) 2 % external shampoo Apply topically daily as needed for irritation or itching      latanoprost (XALATAN) 0.005 % ophthalmic solution instill 1 drop into each eye at bedtime      levothyroxine (SYNTHROID/LEVOTHROID) 88 MCG tablet TAKE 1 TABLET EVERY DAY 90 tablet 2    neomycin-polymyxin-dexamethasone (MAXITROL) 3.5-01799-9.1 ophthalmic ointment Place 0.5 inches into both eyes as needed      valsartan (DIOVAN) 80 MG tablet TAKE 1 TABLET EVERY DAY 90 tablet 2       Allergies   Allergen Reactions    No Known Allergies         Social History     Tobacco Use    Smoking status: Former     Packs/day: 0.50     Years: 2.00     Pack years: 1.00     Types: Cigarettes     Start date: 1961     Quit date: 1963     Years since quittin.3    Smokeless tobacco: Never   Substance Use Topics    Alcohol use: Yes     Comment: 1 glass of wine daily     Family History   Problem Relation Age of Onset    Hypertension Brother     Hypertension Sister     Hyperlipidemia Sister     Other Cancer Sister         CLL    Substance Abuse Sister     Substance Abuse Father          1970    Substance Abuse Brother     Substance Abuse Daughter     Substance Abuse Son      History   Drug  "Use Unknown         Objective     BP (!) 141/84   Pulse 59   Temp 97.7  F (36.5  C) (Temporal)   Resp 16   Ht 1.626 m (5' 4\")   Wt 75.6 kg (166 lb 9.6 oz)   LMP  (LMP Unknown)   SpO2 98%   BMI 28.60 kg/m      Physical Exam    GENERAL APPEARANCE: alert and no distress     EYES: EOMI, PERRL     HENT: ear canals and TM's normal and nose and mouth without ulcers or lesions     NECK: no asymmetry     RESP: lungs clear to auscultation     CV: regular rates and rhythm     ABDOMEN:  soft, nontender, no HSM or masses and bowel sounds normal, ventral hernia present without obstructions     MS: extremities normal- no gross deformities noted, no evidence of inflammation in joints, FROM in all extremities.     SKIN: no suspicious lesions or rashes     NEURO: Normal strength and tone, sensory exam grossly normal, mentation intact and speech normal     PSYCH: mentation appears normal. and affect normal/bright     LYMPHATICS: No cervical adenopathy    Recent Labs   Lab Test 04/03/23  0915 10/18/22  1201 10/13/22  1419   HGB  --   --  15.2   PLT  --   --  169   NA  --   --  140   POTASSIUM  --  4.8 5.8*   CR 0.7  --  0.77   A1C  --   --  5.3        Diagnostics:  Results for orders placed or performed in visit on 09/13/23   Hemoglobin     Status: Normal   Result Value Ref Range    Hemoglobin 15.5 11.7 - 15.7 g/dL   Platelet count     Status: Normal   Result Value Ref Range    Platelet Count 185 150 - 450 10e3/uL              Signed Electronically by: Arelis High MD  Copy of this evaluation report is provided to requesting physician.  "

## 2023-09-12 NOTE — PROGRESS NOTES
[address], [address]  [facility] [address]  Phone: [phone]  Primary Provider: Fawn High  Pre-op Performing Provider: FAWN HIGH      PREOPERATIVE EVALUATION:  Today's date: 9/13/2023    aMrah Mccormick is a 83 year old female who presents for a preoperative evaluation.      Surgical Information:  Surgery/Procedure:   robot assisted ventral hernia repair with mesh Left     Surgery Location: [facility]  Surgeon: Gokul Lujan MD   Surgery Date: 9/22/23  Time of Surgery: 11:00AM  Where patient plans to recover: At home with family  Fax number for surgical facility: Note does not need to be faxed, will be available electronically in Epic.    Assessment & Plan     The proposed surgical procedure is considered INTERMEDIATE risk.  Marah was seen today for pre-op exam.    Diagnoses and all orders for this visit:    Pre-operative cardiovascular examination    Ventral hernia without obstruction or gangrene  -     Hemoglobin; Future  -     Platelet count; Future                  - No identified additional risk factors other than previously addressed    Antiplatelet or Anticoagulation Medication Instructions:   - Patient is on no antiplatelet or anticoagulation medications.    Additional Medication Instructions:  Patient is to take all scheduled medications on the day of surgery    RECOMMENDATION:  APPROVAL GIVEN to proceed with proposed procedure, without further diagnostic evaluation.      Subjective       HPI related to upcoming procedure: patient Marah Mccormick is a very pleasant 83 year old female with ventral hernia who presents to internal medicine clinic for a pre op cardiac evaluation for upcoming obot assisted ventral hernia repair with mesh surgery procedure. No chest pain, headaches, fever or chills.        9/6/2023     3:24 PM   Preop Questions   1. Have you ever had a heart attack or stroke? No   2. Have you ever had surgery on your heart  or blood vessels, such as a stent placement, a coronary artery bypass, or surgery on an artery in your head, neck, heart, or legs? No   3. Do you have chest pain with activity? No   4. Do you have a history of  heart failure? No   5. Do you currently have a cold, bronchitis or symptoms of other infection? No   6. Do you have a cough, shortness of breath, or wheezing? No   7. Do you or anyone in your family have previous history of blood clots? No   8. Do you or does anyone in your family have a serious bleeding problem such as prolonged bleeding following surgeries or cuts? No   9. Have you ever had problems with anemia or been told to take iron pills? No   10. Have you had any abnormal blood loss such as black, tarry or bloody stools, or abnormal vaginal bleeding? No   11. Have you ever had a blood transfusion? No   12. Are you willing to have a blood transfusion if it is medically needed before, during, or after your surgery? Yes   13. Have you or any of your relatives ever had problems with anesthesia? No   14. Do you have sleep apnea, excessive snoring or daytime drowsiness? No   15. Do you have any artifical heart valves or other implanted medical devices like a pacemaker, defibrillator, or continuous glucose monitor? No   16. Do you have artificial joints? No   17. Are you allergic to latex? No       Health Care Directive:  Patient does not have a Health Care Directive or Living Will: Patient states has Advance Directive and will bring in a copy to clinic.      Review of Systems  Constitutional, neuro, ENT, endocrine, pulmonary, cardiac, gastrointestinal, genitourinary, musculoskeletal, integument and psychiatric systems are negative, except as otherwise noted.    Patient Active Problem List    Diagnosis Date Noted    History of malignant melanoma of skin 09/12/2023     Priority: Medium    Erythrocytosis 09/12/2023     Priority: Medium    Melanoma of scalp (H) 10/13/2022     Priority: Medium    Spigelian  hernia 10/25/2021     Priority: Medium    Hyperlipidemia LDL goal <100 10/22/2021     Priority: Medium    Hypothyroidism, unspecified type 10/22/2021     Priority: Medium      Past Medical History:   Diagnosis Date    Cancer (H) 2019    Had melanoma & had surgery  on 2119    Hypertension 2015    Taking Valsartan 80 Mg 1 daily    Thyroid disease     Taking Levothyroxine 88 mg 1 day in morning     Past Surgical History:   Procedure Laterality Date    BIOPSY      Melanoma    COLONOSCOPY      VASCULAR SURGERY  2013    Varicose vein surgery     Current Outpatient Medications   Medication Sig Dispense Refill    atorvastatin (LIPITOR) 10 MG tablet TAKE 1 TABLET EVERY DAY 90 tablet 0    ketoconazole (NIZORAL) 2 % external shampoo Apply topically daily as needed for irritation or itching      latanoprost (XALATAN) 0.005 % ophthalmic solution instill 1 drop into each eye at bedtime      levothyroxine (SYNTHROID/LEVOTHROID) 88 MCG tablet TAKE 1 TABLET EVERY DAY 90 tablet 2    neomycin-polymyxin-dexamethasone (MAXITROL) 3.5-42409-1.1 ophthalmic ointment Place 0.5 inches into both eyes as needed      valsartan (DIOVAN) 80 MG tablet TAKE 1 TABLET EVERY DAY 90 tablet 2       Allergies   Allergen Reactions    No Known Allergies         Social History     Tobacco Use    Smoking status: Former     Packs/day: 0.50     Years: 2.00     Pack years: 1.00     Types: Cigarettes     Start date: 1961     Quit date: 1963     Years since quittin.3    Smokeless tobacco: Never   Substance Use Topics    Alcohol use: Yes     Comment: 1 glass of wine daily     Family History   Problem Relation Age of Onset    Hypertension Brother     Hypertension Sister     Hyperlipidemia Sister     Other Cancer Sister         CLL    Substance Abuse Sister     Substance Abuse Father          1970    Substance Abuse Brother     Substance Abuse Daughter     Substance Abuse Son      History   Drug  "Use Unknown         Objective     BP (!) 141/84   Pulse 59   Temp 97.7  F (36.5  C) (Temporal)   Resp 16   Ht 1.626 m (5' 4\")   Wt 75.6 kg (166 lb 9.6 oz)   LMP  (LMP Unknown)   SpO2 98%   BMI 28.60 kg/m      Physical Exam    GENERAL APPEARANCE: alert and no distress     EYES: EOMI, PERRL     HENT: ear canals and TM's normal and nose and mouth without ulcers or lesions     NECK: no asymmetry     RESP: lungs clear to auscultation     CV: regular rates and rhythm     ABDOMEN:  soft, nontender, no HSM or masses and bowel sounds normal, ventral hernia present without obstructions     MS: extremities normal- no gross deformities noted, no evidence of inflammation in joints, FROM in all extremities.     SKIN: no suspicious lesions or rashes     NEURO: Normal strength and tone, sensory exam grossly normal, mentation intact and speech normal     PSYCH: mentation appears normal. and affect normal/bright     LYMPHATICS: No cervical adenopathy    Recent Labs   Lab Test 04/03/23  0915 10/18/22  1201 10/13/22  1419   HGB  --   --  15.2   PLT  --   --  169   NA  --   --  140   POTASSIUM  --  4.8 5.8*   CR 0.7  --  0.77   A1C  --   --  5.3        Diagnostics:  Results for orders placed or performed in visit on 09/13/23   Hemoglobin     Status: Normal   Result Value Ref Range    Hemoglobin 15.5 11.7 - 15.7 g/dL   Platelet count     Status: Normal   Result Value Ref Range    Platelet Count 185 150 - 450 10e3/uL              Signed Electronically by: Arelis High MD  Copy of this evaluation report is provided to requesting physician.  "

## 2023-09-13 ENCOUNTER — OFFICE VISIT (OUTPATIENT)
Dept: FAMILY MEDICINE | Facility: CLINIC | Age: 83
End: 2023-09-13
Payer: MEDICARE

## 2023-09-13 VITALS
OXYGEN SATURATION: 98 % | HEIGHT: 64 IN | RESPIRATION RATE: 16 BRPM | SYSTOLIC BLOOD PRESSURE: 141 MMHG | BODY MASS INDEX: 28.44 KG/M2 | TEMPERATURE: 97.7 F | WEIGHT: 166.6 LBS | HEART RATE: 59 BPM | DIASTOLIC BLOOD PRESSURE: 84 MMHG

## 2023-09-13 DIAGNOSIS — K43.9 VENTRAL HERNIA WITHOUT OBSTRUCTION OR GANGRENE: ICD-10-CM

## 2023-09-13 DIAGNOSIS — Z01.810 PRE-OPERATIVE CARDIOVASCULAR EXAMINATION: Primary | ICD-10-CM

## 2023-09-13 LAB
HGB BLD-MCNC: 15.5 G/DL (ref 11.7–15.7)
PLATELET # BLD AUTO: 185 10E3/UL (ref 150–450)

## 2023-09-13 PROCEDURE — 93000 ELECTROCARDIOGRAM COMPLETE: CPT | Performed by: INTERNAL MEDICINE

## 2023-09-13 PROCEDURE — 85049 AUTOMATED PLATELET COUNT: CPT | Performed by: INTERNAL MEDICINE

## 2023-09-13 PROCEDURE — 85018 HEMOGLOBIN: CPT | Performed by: INTERNAL MEDICINE

## 2023-09-13 PROCEDURE — 99214 OFFICE O/P EST MOD 30 MIN: CPT | Mod: 25 | Performed by: INTERNAL MEDICINE

## 2023-09-13 PROCEDURE — 36415 COLL VENOUS BLD VENIPUNCTURE: CPT | Performed by: INTERNAL MEDICINE

## 2023-09-13 ASSESSMENT — PAIN SCALES - GENERAL: PAINLEVEL: NO PAIN (0)

## 2023-09-19 ENCOUNTER — DOCUMENTATION ONLY (OUTPATIENT)
Dept: OTHER | Facility: CLINIC | Age: 83
End: 2023-09-19
Payer: MEDICARE

## 2023-09-21 ENCOUNTER — ANESTHESIA EVENT (OUTPATIENT)
Dept: SURGERY | Facility: CLINIC | Age: 83
End: 2023-09-21
Payer: MEDICARE

## 2023-09-21 NOTE — ANESTHESIA PREPROCEDURE EVALUATION
Anesthesia Pre-Procedure Evaluation    Patient: Marah Mccormick   MRN: [id number] : 1940        Procedure : Procedure(s):  robot assisted ventral hernia repair with mesh          Past Medical History:   Diagnosis Date    Cancer (H) 2019    Had melanoma & had surgery  on 2119    Hyperlipidemia LDL goal <100     Hypertension 2015    Taking Valsartan 80 Mg 1 daily    Thyroid disease     Taking Levothyroxine 88 mg 1 day in morning      Past Surgical History:   Procedure Laterality Date    BIOPSY      Melanoma    COLONOSCOPY      VASCULAR SURGERY  2013    Varicose vein surgery      Allergies   Allergen Reactions    No Known Allergies       Social History     Tobacco Use    Smoking status: Former     Packs/day: 0.50     Years: 2.00     Pack years: 1.00     Types: Cigarettes     Start date: 1961     Quit date: 1963     Years since quittin.4    Smokeless tobacco: Never   Substance Use Topics    Alcohol use: Yes     Comment: 1 glass of wine daily      Wt Readings from Last 1 Encounters:   23 75.6 kg (166 lb 9.6 oz)        Anesthesia Evaluation            ROS/MED HX  ENT/Pulmonary:  - neg pulmonary ROS     Neurologic:  - neg neurologic ROS     Cardiovascular:     (+) Dyslipidemia hypertension- -   -  - -                                      METS/Exercise Tolerance:     Hematologic: Comments: erythrocytosis      Musculoskeletal:  - neg musculoskeletal ROS     GI/Hepatic: Comment: Ventral hernia      Renal/Genitourinary:  - neg Renal ROS     Endo:     (+)          thyroid problem, hypothyroidism,           Psychiatric/Substance Use:  - neg psychiatric ROS     Infectious Disease:       Malignancy:   (+) Malignancy, History of Skin.Skin CA Remission status post.      Other:            Physical Exam    Airway        Mallampati: II   TM distance: > 3 FB   Neck ROM: full   Mouth opening: > 3 cm    Respiratory Devices and Support         Dental       (+) Modest  Abnormalities - crowns, retainers, 1 or 2 missing teeth      Cardiovascular   cardiovascular exam normal          Pulmonary   pulmonary exam normal                OUTSIDE LABS:  CBC:   Lab Results   Component Value Date    WBC 6.3 10/13/2022    HGB 15.5 09/13/2023    HGB 15.2 10/13/2022    HCT 46.0 10/13/2022     09/13/2023     10/13/2022     BMP:   Lab Results   Component Value Date     10/13/2022    POTASSIUM 4.8 10/18/2022    POTASSIUM 5.8 (H) 10/13/2022    CHLORIDE 108 10/13/2022    CO2 28 10/13/2022    BUN 12 10/13/2022    CR 0.7 04/03/2023    CR 0.77 10/13/2022    GLC 80 10/13/2022     COAGS: No results found for: PTT, INR, FIBR  POC: No results found for: BGM, HCG, HCGS  HEPATIC: No results found for: ALBUMIN, PROTTOTAL, ALT, AST, GGT, ALKPHOS, BILITOTAL, BILIDIRECT, DEDE  OTHER:   Lab Results   Component Value Date    A1C 5.3 10/13/2022    SANJU 9.4 10/13/2022    TSH 0.91 10/13/2022       Anesthesia Plan    ASA Status:  3    NPO Status:  NPO Appropriate    Anesthesia Type: General.     - Airway: ETT   Induction: Intravenous, Propofol.   Maintenance: Balanced.        Consents    Anesthesia Plan(s) and associated risks, benefits, and realistic alternatives discussed. Questions answered and patient/representative(s) expressed understanding.     - Discussed:     - Discussed with:  Patient            Postoperative Care    Pain management: IV analgesics.   PONV prophylaxis: Ondansetron (or other 5HT-3), Background Propofol Infusion, Dexamethasone or Solumedrol     Comments:                Kvng Gaviria, DO, DO

## 2023-09-22 ENCOUNTER — HOSPITAL ENCOUNTER (OUTPATIENT)
Facility: CLINIC | Age: 83
Discharge: HOME | End: 2023-09-22
Attending: SURGERY | Admitting: SURGERY
Payer: MEDICARE

## 2023-09-22 ENCOUNTER — ANESTHESIA (OUTPATIENT)
Dept: SURGERY | Facility: CLINIC | Age: 83
End: 2023-09-22
Payer: MEDICARE

## 2023-09-22 ENCOUNTER — APPOINTMENT (OUTPATIENT)
Dept: SURGERY | Facility: PHYSICIAN GROUP | Age: 83
End: 2023-09-22
Payer: MEDICARE

## 2023-09-22 VITALS
BODY MASS INDEX: 27.88 KG/M2 | RESPIRATION RATE: 20 BRPM | SYSTOLIC BLOOD PRESSURE: 163 MMHG | WEIGHT: 163.3 LBS | HEART RATE: 62 BPM | OXYGEN SATURATION: 99 % | DIASTOLIC BLOOD PRESSURE: 98 MMHG | TEMPERATURE: 97.5 F | HEIGHT: 64 IN

## 2023-09-22 DIAGNOSIS — G89.18 POSTOPERATIVE PAIN: Primary | ICD-10-CM

## 2023-09-22 DIAGNOSIS — K43.9 SPIGELIAN HERNIA: ICD-10-CM

## 2023-09-22 PROCEDURE — 710N000012 HC RECOVERY PHASE 2, PER MINUTE: Performed by: SURGERY

## 2023-09-22 PROCEDURE — 250N000011 HC RX IP 250 OP 636: Performed by: ANESTHESIOLOGY

## 2023-09-22 PROCEDURE — 250N000009 HC RX 250: Performed by: SURGERY

## 2023-09-22 PROCEDURE — 49591 RPR AA HRN 1ST < 3 CM RDC: CPT | Performed by: SURGERY

## 2023-09-22 PROCEDURE — 370N000017 HC ANESTHESIA TECHNICAL FEE, PER MIN: Performed by: SURGERY

## 2023-09-22 PROCEDURE — 250N000025 HC SEVOFLURANE, PER MIN: Performed by: SURGERY

## 2023-09-22 PROCEDURE — 360N000080 HC SURGERY LEVEL 7, PER MIN: Performed by: SURGERY

## 2023-09-22 PROCEDURE — 272N000001 HC OR GENERAL SUPPLY STERILE: Performed by: SURGERY

## 2023-09-22 PROCEDURE — 250N000009 HC RX 250: Performed by: NURSE ANESTHETIST, CERTIFIED REGISTERED

## 2023-09-22 PROCEDURE — 250N000011 HC RX IP 250 OP 636: Mod: JZ | Performed by: NURSE ANESTHETIST, CERTIFIED REGISTERED

## 2023-09-22 PROCEDURE — S2900 ROBOTIC SURGICAL SYSTEM: HCPCS | Performed by: SURGERY

## 2023-09-22 PROCEDURE — 258N000003 HC RX IP 258 OP 636: Performed by: ANESTHESIOLOGY

## 2023-09-22 PROCEDURE — C1781 MESH (IMPLANTABLE): HCPCS | Performed by: SURGERY

## 2023-09-22 PROCEDURE — 49591 RPR AA HRN 1ST < 3 CM RDC: CPT | Mod: AS | Performed by: PHYSICIAN ASSISTANT

## 2023-09-22 PROCEDURE — 250N000011 HC RX IP 250 OP 636: Performed by: SURGERY

## 2023-09-22 PROCEDURE — 999N000141 HC STATISTIC PRE-PROCEDURE NURSING ASSESSMENT: Performed by: SURGERY

## 2023-09-22 PROCEDURE — 258N000003 HC RX IP 258 OP 636: Performed by: NURSE ANESTHETIST, CERTIFIED REGISTERED

## 2023-09-22 PROCEDURE — 250N000012 HC RX MED GY IP 250 OP 636 PS 637: Performed by: ANESTHESIOLOGY

## 2023-09-22 PROCEDURE — 250N000013 HC RX MED GY IP 250 OP 250 PS 637: Performed by: PHYSICIAN ASSISTANT

## 2023-09-22 PROCEDURE — 710N000009 HC RECOVERY PHASE 1, LEVEL 1, PER MIN: Performed by: SURGERY

## 2023-09-22 DEVICE — COMPOSITE MESH,MONOFILAMENT POLYESTER WITH ABSORBABLE COLLAGEN FILM AND MARKING
Type: IMPLANTABLE DEVICE | Site: ABDOMEN | Status: FUNCTIONAL
Brand: SYMBOTEX

## 2023-09-22 RX ORDER — ONDANSETRON 4 MG/1
4 TABLET, ORALLY DISINTEGRATING ORAL EVERY 30 MIN PRN
Status: DISCONTINUED | OUTPATIENT
Start: 2023-09-22 | End: 2023-09-22 | Stop reason: HOSPADM

## 2023-09-22 RX ORDER — PROPOFOL 10 MG/ML
INJECTION, EMULSION INTRAVENOUS CONTINUOUS PRN
Status: DISCONTINUED | OUTPATIENT
Start: 2023-09-22 | End: 2023-09-22

## 2023-09-22 RX ORDER — ONDANSETRON 2 MG/ML
4 INJECTION INTRAMUSCULAR; INTRAVENOUS EVERY 30 MIN PRN
Status: DISCONTINUED | OUTPATIENT
Start: 2023-09-22 | End: 2023-09-22 | Stop reason: HOSPADM

## 2023-09-22 RX ORDER — SODIUM CHLORIDE, SODIUM LACTATE, POTASSIUM CHLORIDE, CALCIUM CHLORIDE 600; 310; 30; 20 MG/100ML; MG/100ML; MG/100ML; MG/100ML
INJECTION, SOLUTION INTRAVENOUS CONTINUOUS
Status: DISCONTINUED | OUTPATIENT
Start: 2023-09-22 | End: 2023-09-22 | Stop reason: HOSPADM

## 2023-09-22 RX ORDER — APREPITANT 40 MG/1
40 CAPSULE ORAL ONCE
Status: COMPLETED | OUTPATIENT
Start: 2023-09-22 | End: 2023-09-22

## 2023-09-22 RX ORDER — ACETAMINOPHEN 325 MG/1
650 TABLET ORAL EVERY 6 HOURS PRN
Qty: 60 TABLET | Refills: 0 | Status: SHIPPED | OUTPATIENT
Start: 2023-09-22

## 2023-09-22 RX ORDER — FENTANYL CITRATE 50 UG/ML
25 INJECTION, SOLUTION INTRAMUSCULAR; INTRAVENOUS EVERY 5 MIN PRN
Status: DISCONTINUED | OUTPATIENT
Start: 2023-09-22 | End: 2023-09-22 | Stop reason: HOSPADM

## 2023-09-22 RX ORDER — SODIUM CHLORIDE, SODIUM LACTATE, POTASSIUM CHLORIDE, CALCIUM CHLORIDE 600; 310; 30; 20 MG/100ML; MG/100ML; MG/100ML; MG/100ML
INJECTION, SOLUTION INTRAVENOUS CONTINUOUS PRN
Status: DISCONTINUED | OUTPATIENT
Start: 2023-09-22 | End: 2023-09-22

## 2023-09-22 RX ORDER — CEFAZOLIN SODIUM/WATER 2 G/20 ML
2 SYRINGE (ML) INTRAVENOUS SEE ADMIN INSTRUCTIONS
Status: DISCONTINUED | OUTPATIENT
Start: 2023-09-22 | End: 2023-09-22 | Stop reason: HOSPADM

## 2023-09-22 RX ORDER — LIDOCAINE HYDROCHLORIDE 20 MG/ML
INJECTION, SOLUTION INFILTRATION; PERINEURAL PRN
Status: DISCONTINUED | OUTPATIENT
Start: 2023-09-22 | End: 2023-09-22

## 2023-09-22 RX ORDER — ONDANSETRON 2 MG/ML
INJECTION INTRAMUSCULAR; INTRAVENOUS PRN
Status: DISCONTINUED | OUTPATIENT
Start: 2023-09-22 | End: 2023-09-22

## 2023-09-22 RX ORDER — FENTANYL CITRATE 50 UG/ML
50 INJECTION, SOLUTION INTRAMUSCULAR; INTRAVENOUS EVERY 5 MIN PRN
Status: DISCONTINUED | OUTPATIENT
Start: 2023-09-22 | End: 2023-09-22 | Stop reason: HOSPADM

## 2023-09-22 RX ORDER — BUPIVACAINE HYDROCHLORIDE AND EPINEPHRINE 5; 5 MG/ML; UG/ML
INJECTION, SOLUTION PERINEURAL PRN
Status: DISCONTINUED | OUTPATIENT
Start: 2023-09-22 | End: 2023-09-22 | Stop reason: HOSPADM

## 2023-09-22 RX ORDER — FENTANYL CITRATE 50 UG/ML
INJECTION, SOLUTION INTRAMUSCULAR; INTRAVENOUS PRN
Status: DISCONTINUED | OUTPATIENT
Start: 2023-09-22 | End: 2023-09-22

## 2023-09-22 RX ORDER — DEXAMETHASONE SODIUM PHOSPHATE 4 MG/ML
INJECTION, SOLUTION INTRA-ARTICULAR; INTRALESIONAL; INTRAMUSCULAR; INTRAVENOUS; SOFT TISSUE PRN
Status: DISCONTINUED | OUTPATIENT
Start: 2023-09-22 | End: 2023-09-22

## 2023-09-22 RX ORDER — CEFAZOLIN SODIUM/WATER 2 G/20 ML
2 SYRINGE (ML) INTRAVENOUS
Status: COMPLETED | OUTPATIENT
Start: 2023-09-22 | End: 2023-09-22

## 2023-09-22 RX ORDER — METHOCARBAMOL 500 MG/1
250-500 TABLET, FILM COATED ORAL 3 TIMES DAILY PRN
Qty: 15 TABLET | Refills: 0 | Status: SHIPPED | OUTPATIENT
Start: 2023-09-22

## 2023-09-22 RX ORDER — HYDROMORPHONE HCL IN WATER/PF 6 MG/30 ML
0.4 PATIENT CONTROLLED ANALGESIA SYRINGE INTRAVENOUS EVERY 5 MIN PRN
Status: DISCONTINUED | OUTPATIENT
Start: 2023-09-22 | End: 2023-09-22 | Stop reason: HOSPADM

## 2023-09-22 RX ORDER — HYDROMORPHONE HCL IN WATER/PF 6 MG/30 ML
0.2 PATIENT CONTROLLED ANALGESIA SYRINGE INTRAVENOUS EVERY 5 MIN PRN
Status: DISCONTINUED | OUTPATIENT
Start: 2023-09-22 | End: 2023-09-22 | Stop reason: HOSPADM

## 2023-09-22 RX ORDER — OXYCODONE HYDROCHLORIDE 5 MG/1
2.5-5 TABLET ORAL EVERY 4 HOURS PRN
Qty: 10 TABLET | Refills: 0 | Status: SHIPPED | OUTPATIENT
Start: 2023-09-22

## 2023-09-22 RX ORDER — PROPOFOL 10 MG/ML
INJECTION, EMULSION INTRAVENOUS PRN
Status: DISCONTINUED | OUTPATIENT
Start: 2023-09-22 | End: 2023-09-22

## 2023-09-22 RX ORDER — LIDOCAINE 40 MG/G
CREAM TOPICAL
Status: DISCONTINUED | OUTPATIENT
Start: 2023-09-22 | End: 2023-09-22 | Stop reason: HOSPADM

## 2023-09-22 RX ORDER — AMOXICILLIN 250 MG
1-2 CAPSULE ORAL 2 TIMES DAILY PRN
Qty: 15 TABLET | Refills: 0 | Status: SHIPPED | OUTPATIENT
Start: 2023-09-22

## 2023-09-22 RX ADMIN — SODIUM CHLORIDE, POTASSIUM CHLORIDE, SODIUM LACTATE AND CALCIUM CHLORIDE: 600; 310; 30; 20 INJECTION, SOLUTION INTRAVENOUS at 12:57

## 2023-09-22 RX ADMIN — FENTANYL CITRATE 25 MCG: 50 INJECTION, SOLUTION INTRAMUSCULAR; INTRAVENOUS at 13:22

## 2023-09-22 RX ADMIN — PROPOFOL 100 MCG/KG/MIN: 10 INJECTION, EMULSION INTRAVENOUS at 11:30

## 2023-09-22 RX ADMIN — OXYCODONE HYDROCHLORIDE 5 MG: 5 TABLET ORAL at 14:53

## 2023-09-22 RX ADMIN — FENTANYL CITRATE 50 MCG: 50 INJECTION, SOLUTION INTRAMUSCULAR; INTRAVENOUS at 13:37

## 2023-09-22 RX ADMIN — FENTANYL CITRATE 50 MCG: 50 INJECTION, SOLUTION INTRAMUSCULAR; INTRAVENOUS at 11:55

## 2023-09-22 RX ADMIN — LIDOCAINE HYDROCHLORIDE 60 MG: 20 INJECTION, SOLUTION INFILTRATION; PERINEURAL at 11:15

## 2023-09-22 RX ADMIN — DEXAMETHASONE SODIUM PHOSPHATE 4 MG: 4 INJECTION, SOLUTION INTRA-ARTICULAR; INTRALESIONAL; INTRAMUSCULAR; INTRAVENOUS; SOFT TISSUE at 11:25

## 2023-09-22 RX ADMIN — SODIUM CHLORIDE, POTASSIUM CHLORIDE, SODIUM LACTATE AND CALCIUM CHLORIDE: 600; 310; 30; 20 INJECTION, SOLUTION INTRAVENOUS at 09:33

## 2023-09-22 RX ADMIN — Medication 2 G: at 11:09

## 2023-09-22 RX ADMIN — FENTANYL CITRATE 50 MCG: 50 INJECTION, SOLUTION INTRAMUSCULAR; INTRAVENOUS at 11:15

## 2023-09-22 RX ADMIN — SODIUM CHLORIDE, POTASSIUM CHLORIDE, SODIUM LACTATE AND CALCIUM CHLORIDE: 600; 310; 30; 20 INJECTION, SOLUTION INTRAVENOUS at 11:09

## 2023-09-22 RX ADMIN — PROPOFOL 200 MG: 10 INJECTION, EMULSION INTRAVENOUS at 11:15

## 2023-09-22 RX ADMIN — APREPITANT 40 MG: 40 CAPSULE ORAL at 10:40

## 2023-09-22 RX ADMIN — SUGAMMADEX 200 MG: 100 INJECTION, SOLUTION INTRAVENOUS at 12:45

## 2023-09-22 RX ADMIN — ROCURONIUM BROMIDE 50 MG: 50 INJECTION, SOLUTION INTRAVENOUS at 11:15

## 2023-09-22 RX ADMIN — ONDANSETRON 4 MG: 2 INJECTION INTRAMUSCULAR; INTRAVENOUS at 12:35

## 2023-09-22 RX ADMIN — FENTANYL CITRATE 25 MCG: 50 INJECTION, SOLUTION INTRAMUSCULAR; INTRAVENOUS at 13:28

## 2023-09-22 ASSESSMENT — ACTIVITIES OF DAILY LIVING (ADL)
ADLS_ACUITY_SCORE: 35

## 2023-09-22 NOTE — ANESTHESIA POSTPROCEDURE EVALUATION
Patient: Marah Mccormick    Procedure: Procedure(s):  robot assisted ventral hernia repair with mesh       Anesthesia Type:  General    Note:     Postop Pain Control: Uneventful            Sign Out: Well controlled pain   PONV: No   Neuro/Psych: Uneventful            Sign Out: Acceptable/Baseline neuro status   Airway/Respiratory: Uneventful            Sign Out: Acceptable/Baseline resp. status   CV/Hemodynamics: Uneventful            Sign Out: Acceptable CV status; No obvious hypovolemia; No obvious fluid overload   Other NRE: NONE   DID A NON-ROUTINE EVENT OCCUR?            Last vitals:  Vitals Value Taken Time   /93 09/22/23 1458   Temp 36.1  C (97  F) 09/22/23 1458   Pulse 62 09/22/23 1458   Resp 16 09/22/23 1458   SpO2 98 % 09/22/23 1459   Vitals shown include unvalidated device data.    Electronically Signed By: Kvng Gaviria DO, DO  September 22, 2023  3:20 PM

## 2023-09-22 NOTE — BRIEF OP NOTE
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 Item Serial No.  Lot No. LRB No. Used Action   MESH SYMBOTEX COMPOSITE STEX ROUND 12CM SYM12 - [id number] Mesh MESH SYMBOTEX COMPOSITE STEX ROUND 12CM SYM12  [facility] [id number] Left 1 Implanted     Findings: As above. No immediate complications. See operative report for full details.      Swati Syed PA-C  [facility]  [phone]

## 2023-09-22 NOTE — DISCHARGE INSTRUCTIONS
Same Day Surgery Discharge Instructions for  Sedation and General Anesthesia     It's not unusual to feel dizzy, light-headed or faint for up to 24 hours after surgery or while taking pain medication.  If you have these symptoms: sit for a few minutes before standing and have someone assist you when you get up to walk or use the bathroom.    You should rest and relax for the next 24 hours. We recommend you make arrangements to have an adult stay with you for at least 24 hours after your discharge.  Avoid hazardous and strenuous activity.    DO NOT DRIVE any vehicle or operate mechanical equipment for 24 hours following the end of your surgery.  Even though you may feel normal, your reactions may be affected by the medication you have received.    Do not drink alcoholic beverages for 24 hours following surgery.     Slowly progress to your regular diet as you feel able. It's not unusual to feel nauseated and/or vomit after receiving anesthesia.  If you develop these symptoms, drink clear liquids (apple juice, ginger ale, broth, 7-up, etc. ) until you feel better.  If your nausea and vomiting persists for 24 hours, please notify your surgeon.      All narcotic pain medications, along with inactivity and anesthesia, can cause constipation. Drinking plenty of liquids and increasing fiber intake will help.    For any questions of a medical nature, call your surgeon.    Do not make important decisions for 24 hours.    If you had general anesthesia, you may have a sore throat for a couple of days related to the breathing tube used during surgery.  You may use Cepacol lozenges to help with this discomfort.  If it worsens or if you develop a fever, contact your surgeon.     If you feel your pain is not well managed with the pain medications prescribed by your surgeon, please contact your surgeon's office to let them know so they can address your concerns.     **If you have concerns or questions about your procedure,    please  contact Dr Lujan at  [phone]**    [facility]  Discharge Instructions: Post-Operative Ventral Hernia Repair    ACTIVITY  Take frequent, short walks and increase your activity gradually.    Avoid strenuous physical activity or heavy lifting greater than 15 lbs. for 2-3 weeks.  You may climb stairs.  You may drive without restrictions when you are not using any prescription pain medication or muscle relaxant and feel comfortable in a car.  You may return to work/school when you are comfortable without any prescription pain medication.   You may wear an abdominal binder for comfort for 2-3 weeks from your surgery.  You can wash the abdominal binder and dry it on low heat in the dryer.    WOUND CARE  You may shower 48 hours after the surgery.  Pat your incisions dry and leave them open to air.     You have skin glue on your incisions. Do not pick at the glue; it will slowly peel up and fall off on its own.  Do not soak your incisions in a tub or pool for 2 weeks.   Do not apply any lotions, creams, or ointments to your incisions.  A ridge under your incisions is normal and will gradually resolve.    DIET  Start with liquids, then gradually resume your regular diet as tolerated.   Drink plenty of fluids to stay hydrated.    PAIN  Expect some tenderness and discomfort at the incision site(s).  Use the prescribed pain medication/muscle relaxant at your discretion.  Expect gradual resolution of your pain over several days.  You may take ibuprofen with food (unless you have been told not to) or acetaminophen/Tylenol instead of or in addition to your prescribed pain medication.   Do not drink alcohol or drive while you are taking pain medications or muscle relaxants.  You may apply ice to your incisions in 20 minute intervals as needed for the next 48 hours.  After that time, consider switching to heat if you prefer.    EXPECTATIONS  Pain medications can cause constipation.  Limit use when  possible.  Take an over the counter or prescribed stool softener/stimulant, such as Colace or Senna, 1-2 times a day with plenty of water.  You may take a mild over the counter laxative, such as Miralax or a suppository, as needed. You may take 1 oz. (2 tablespoons) Milk of Magnesia the evening following surgery to encourage bowel movement.  You make discontinue these medications once you are having regular bowel movements and/or are no longer taking your narcotic pain medication.  You may have shoulder or upper back discomfort due to the gas used in surgery.  This is temporary and should resolve in 48-72 hours.  Short, frequent walks may help with this.  If you are unable to urinate for 8 hours or feel as though you are not emptying your bladder adequately, we recommend you seek care at an ER or Urgent Care facility for possible catheter placement.     FOLLOW UP  Our office will contact you in approximately 2-3 weeks to check on your progress and answer any questions you may have.  If you are doing well, you will not need to return for a follow up appointment.  If any concerns are identified over the phone, we will help you make an appointment to see a provider.  If you have not received a phone call, have any questions or concerns, or would like to be seen, please call us at [phone] and ask to speak with our nurse.  We are located at [address] [place] [address].    CALL OUR OFFICE [phone] IF YOU HAVE:   Chills or fever above 101 F.  Increased redness or drainage at your incisions.  Significant bleeding.  Pain not relieved by your pain medication or rest.  Increasing pain after the first 48 hours.  Any other concerns or questions.           Same Day Surgery Discharge Instructions for  Sedation and General Anesthesia     It's not unusual to feel dizzy, light-headed or faint for up to 24 hours after surgery or while taking pain medication.  If you have these symptoms: sit for a  few minutes before standing and have someone assist you when you get up to walk or use the bathroom.    You should rest and relax for the next 24 hours. We recommend you make arrangements to have an adult stay with you for at least 24 hours after your discharge.  Avoid hazardous and strenuous activity.    DO NOT DRIVE any vehicle or operate mechanical equipment for 24 hours following the end of your surgery.  Even though you may feel normal, your reactions may be affected by the medication you have received.    Do not drink alcoholic beverages for 24 hours following surgery.     Slowly progress to your regular diet as you feel able. It's not unusual to feel nauseated and/or vomit after receiving anesthesia.  If you develop these symptoms, drink clear liquids (apple juice, ginger ale, broth, 7-up, etc. ) until you feel better.  If your nausea and vomiting persists for 24 hours, please notify your surgeon.      All narcotic pain medications, along with inactivity and anesthesia, can cause constipation. Drinking plenty of liquids and increasing fiber intake will help.    For any questions of a medical nature, call your surgeon.    Do not make important decisions for 24 hours.    If you had general anesthesia, you may have a sore throat for a couple of days related to the breathing tube used during surgery.  You may use Cepacol lozenges to help with this discomfort.  If it worsens or if you develop a fever, contact your surgeon.     If you feel your pain is not well managed with the pain medications prescribed by your surgeon, please contact your surgeon's office to let them know so they can address your concerns.      **If you have concerns or questions about your procedure,    please contact Dr Lujan at  [phone]**

## 2023-09-22 NOTE — ANESTHESIA CARE TRANSFER NOTE
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    Anesthesia Type:   General     Note:    Oropharynx: spontaneously breathing  Level of Consciousness: awake  Oxygen Supplementation: face mask  Level of Supplemental Oxygen (L/min / FiO2): 6  Independent Airway: airway patency satisfactory and stable  Dentition: dentition unchanged  Vital Signs Stable: post-procedure vital signs reviewed and stable    Patient transferred to: PACU  Comments: Neuromuscular blockade reversed with sugammadex, spontaneous respirations, adequate tidal volumes, followed commands to voice, oropharynx suctioned with soft flexible catheter, extubated atraumatically, extubated with suction, airway patent after extubation.  Oxygen via facemask at 6 liters per minute to PACU. Oxygen tubing connected to wall O2 in PACU, SpO2, NiBP, and EKG monitors and alarms on and functioning, José Hugger warmer connected to patient gown, report on patient's clinical status given to PACU RN, RN questions answered.         Handoff Report: Identifed the Patient, Identified the Reponsible Provider, Reviewed the pertinent medical history, Discussed the surgical course, Reviewed Intra-OP anesthesia mangement and issues during anesthesia, Set expectations for post-procedure period and Allowed opportunity for questions and acknowledgement of understanding      Vitals:  Vitals Value Taken Time   BP     Temp     Pulse 70 09/22/23 1311   Resp 9 09/22/23 1311   SpO2 100 % 09/22/23 1311   Vitals shown include unvalidated device data.    Electronically Signed By: BEL Suarez CRNA  September 22, 2023  1:12 PM

## 2023-09-22 NOTE — INTERVAL H&P NOTE
"I have reviewed the surgical (or preoperative) H&P that is linked to this encounter, and examined the patient. There are no significant changes    Clinical Conditions Present on Arrival:  Clinically Significant Risk Factors Present on Admission                  # Overweight: Estimated body mass index is 28.6 kg/m  as calculated from the following:    Height as of 9/13/23: 1.626 m (5' 4\").    Weight as of 9/13/23: 75.6 kg (166 lb 9.6 oz).       "

## 2023-09-22 NOTE — ANESTHESIA PROCEDURE NOTES
Airway         Procedure Start/Stop Times: 9/22/2023 11:18 AM  Staff -        Anesthesiologist:  Kvng Gaviria DO       CRNA: Meri Preciado APRN CRNA       Other Anesthesia Staff: Julio César Lim RN       Performed By: SRNA and with CRNAs       Procedure performed by resident/fellow/CRNA in presence of a teaching physician.  Indications and Patient Condition       Indications for airway management: benji-procedural         Mask difficulty assessment: 1 - vent by mask    Final Airway Details       Final airway type: endotracheal airway       Successful airway: ETT - single  Endotracheal Airway Details        ETT size (mm): 7.0       Cuffed: yes       Successful intubation technique: direct laryngoscopy       DL Blade Type: Walters 2       Grade View of Cords: 1       Adjucts: stylet       Position: Right       Measured from: lips       Secured at (cm): 21       Bite block used: None    Post intubation assessment        Placement verified by: capnometry, equal breath sounds and chest rise        Number of attempts at approach: 1       Number of other approaches attempted: 0       Secured with: silk tape       Ease of procedure: easy       Dentition: Intact and Unchanged    Medication(s) Administered   Medication Administration Time: 9/22/2023 11:18 AM

## 2023-09-25 NOTE — OP NOTE
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    PREOPERATIVE MEDICATIONS: Ancef    SURGEON:  Gokul Lujan MD    ASSISTANT:  Swati Syed PA-C.  Assistant was required owing to challenging exposure and need for retraction.    INDICATIONS: Patient had originally seen me months ago with complaints of a bulge in the left lower quadrant.  CT scan at that time suggested a spigelian hernia containing nonobstructed bowel.  She is now here for discussion of surgical repair.    PROCEDURE:  The patient was taken to the operating suite and uneventfully endotracheally intubated.  The abdomen was prepped and draped in a sterile fashion.  Surgeon initiated timeout was acknowledged.  We made an incision in the right upper quadrant and entered the abdomen using a 5 mm Visiport technique.  2 other 8 mm reusable trochars were placed under laparoscopic visualization and the 5 mm trocar was replaced with an 8 mm reusable trocar.  The robot was then brought into the field and docked.  Ventral hernia was targeted and a grasper and monopolar scissors were used.  Some omental fat and adhesions were seen entering the hernia and these were taken down using a combination of sharp and blunt dissection.  We identified the hernia in the left lower quadrant, which measured approximately 2.5 cm in diameter.  We grasped the hernia sac and this was carefully reduced.  We elected to excise the sac and a significant amount of preperitoneal fat from the hernia itself.  This was amputated and brought out of the abdomen with a Endo Catch bag.  Once the hernia was completely reduced, I closed the defect in 2 layers using a running #1 strata fix suture.  The defect was down in the left lower quadrant somewhat near the adnexal structures on the left.  I elected to use a 12 cm piece  of Symbotex mesh to reinforce the repair.  This was centered over the primarily repaired hernia and secured circumferentially with running 2-0 Stratafix suture.  Once this was completed we felt that the repair was well reinforced and secure.  There was no gaps around the mesh to allow for entrance of bowel.  The abdomen was inspected for any ongoing bleeding or inadvertent injury, and we were reassured.  Insufflation was released and the trochars were removed under laparoscopic visualization.  The skin edges were reapproximated with 4-0 Vicryl and Steri-Strips.  The patient was uneventfully extubated, awakened and taken to the PACU in stable condition.  At the conclusion of the case, all lap and needle counts were correct.      ESTIMATED BLOOD LOSS: 10 mL    INTRAOPERATIVE FINDINGS: 2.5 cm spigelian hernia in the left lower quadrant containing preperitoneal fat and small bowel.  Sac excised, defect closed primarily, reinforced with 12 cm intraperitoneal mesh.    Gokul Lujan MD, MD

## 2023-10-13 ENCOUNTER — TELEPHONE (OUTPATIENT)
Dept: SURGERY | Facility: CLINIC | Age: 83
End: 2023-10-13
Payer: MEDICARE

## 2023-10-13 NOTE — CONFIDENTIAL NOTE
[facility]  Post op call note  October 13, 2023       Marah Mccormick was called for an update regarding her recovery.  There was no answer and a message was left encouraging her to call us back with any questions, concerns, or to provide an update.        Swati Syed PA-C  [facility]  [phone]

## 2023-10-16 ENCOUNTER — TELEPHONE (OUTPATIENT)
Dept: SURGERY | Facility: CLINIC | Age: 83
End: 2023-10-16
Payer: MEDICARE

## 2023-10-16 NOTE — TELEPHONE ENCOUNTER
Name of caller: Patient    Reason for Call:  patient wondering if she is ok to get a flu shot now? Or should she wait longer post surgery?    Surgeon:  Dr. Lujan     Recent Surgery:  Yes.    If yes, when & what type:  9/22/23    robot assisted ventral hernia repair with mesh       Best phone number to reach pt at is: [phone]    Ok to leave a message with medical info? Yes.    .

## 2023-10-16 NOTE — TELEPHONE ENCOUNTER
Procedure: robot assisted ventral hernia repair with mesh    Date: 9/22/23    Surgeon: Le    Patient wondering if it is okay to get both the flu vaccine and covid booster at this point post operatively    Informed her that yes, it is okay to get the vaccines.    She will call MADAI Johnson RN-BSN

## 2023-10-23 ENCOUNTER — IMMUNIZATION (OUTPATIENT)
Dept: FAMILY MEDICINE | Facility: CLINIC | Age: 83
End: 2023-10-23
Payer: MEDICARE

## 2023-10-23 DIAGNOSIS — Z23 NEED FOR PROPHYLACTIC VACCINATION AND INOCULATION AGAINST INFLUENZA: Primary | ICD-10-CM

## 2023-10-23 PROCEDURE — 90662 IIV NO PRSV INCREASED AG IM: CPT

## 2023-10-23 PROCEDURE — G0008 ADMIN INFLUENZA VIRUS VAC: HCPCS

## 2023-11-01 DIAGNOSIS — E03.9 HYPOTHYROIDISM, UNSPECIFIED TYPE: ICD-10-CM

## 2023-11-01 ASSESSMENT — ENCOUNTER SYMPTOMS
PARESTHESIAS: 0
CONSTIPATION: 0
EYE PAIN: 0
PALPITATIONS: 0
BREAST MASS: 0
FREQUENCY: 0
JOINT SWELLING: 0
WEAKNESS: 0
COUGH: 0
DYSURIA: 0
HEARTBURN: 0
ARTHRALGIAS: 1
DIZZINESS: 0
NERVOUS/ANXIOUS: 0
HEMATURIA: 0
DIARRHEA: 0
NAUSEA: 0
FEVER: 0
CHILLS: 0
ABDOMINAL PAIN: 0
MYALGIAS: 1
SHORTNESS OF BREATH: 0
SORE THROAT: 0
HEMATOCHEZIA: 0
HEADACHES: 0

## 2023-11-01 ASSESSMENT — ACTIVITIES OF DAILY LIVING (ADL): CURRENT_FUNCTION: NO ASSISTANCE NEEDED

## 2023-11-08 ENCOUNTER — OFFICE VISIT (OUTPATIENT)
Dept: FAMILY MEDICINE | Facility: CLINIC | Age: 83
End: 2023-11-08
Payer: MEDICARE

## 2023-11-08 VITALS
TEMPERATURE: 98 F | BODY MASS INDEX: 27.83 KG/M2 | OXYGEN SATURATION: 96 % | HEIGHT: 64 IN | WEIGHT: 163 LBS | RESPIRATION RATE: 16 BRPM | SYSTOLIC BLOOD PRESSURE: 121 MMHG | HEART RATE: 56 BPM | DIASTOLIC BLOOD PRESSURE: 77 MMHG

## 2023-11-08 DIAGNOSIS — Z00.00 ENCOUNTER FOR MEDICARE ANNUAL WELLNESS EXAM: Primary | ICD-10-CM

## 2023-11-08 DIAGNOSIS — E78.5 HYPERLIPIDEMIA LDL GOAL <100: ICD-10-CM

## 2023-11-08 DIAGNOSIS — Z13.1 SCREENING FOR DIABETES MELLITUS: ICD-10-CM

## 2023-11-08 DIAGNOSIS — E03.9 HYPOTHYROIDISM, UNSPECIFIED TYPE: ICD-10-CM

## 2023-11-08 LAB
CHOLEST SERPL-MCNC: 133 MG/DL
HBA1C MFR BLD: 5.3 % (ref 0–5.6)
HDLC SERPL-MCNC: 50 MG/DL
LDLC SERPL CALC-MCNC: 68 MG/DL
NONHDLC SERPL-MCNC: 83 MG/DL
TRIGL SERPL-MCNC: 74 MG/DL
TSH SERPL DL<=0.005 MIU/L-ACNC: 3.12 UIU/ML (ref 0.3–4.2)

## 2023-11-08 PROCEDURE — 83036 HEMOGLOBIN GLYCOSYLATED A1C: CPT | Mod: GZ | Performed by: INTERNAL MEDICINE

## 2023-11-08 PROCEDURE — 84443 ASSAY THYROID STIM HORMONE: CPT | Performed by: INTERNAL MEDICINE

## 2023-11-08 PROCEDURE — 36415 COLL VENOUS BLD VENIPUNCTURE: CPT | Performed by: INTERNAL MEDICINE

## 2023-11-08 PROCEDURE — 80061 LIPID PANEL: CPT | Performed by: INTERNAL MEDICINE

## 2023-11-08 PROCEDURE — G0439 PPPS, SUBSEQ VISIT: HCPCS | Performed by: INTERNAL MEDICINE

## 2023-11-08 RX ORDER — VALSARTAN 80 MG/1
80 TABLET ORAL DAILY
Qty: 90 TABLET | Refills: 2 | Status: SHIPPED | OUTPATIENT
Start: 2023-11-08 | End: 2023-11-08

## 2023-11-08 RX ORDER — ATORVASTATIN CALCIUM 10 MG/1
10 TABLET, FILM COATED ORAL DAILY
Qty: 90 TABLET | Refills: 0 | Status: SHIPPED | OUTPATIENT
Start: 2023-11-08 | End: 2024-04-11

## 2023-11-08 RX ORDER — ATORVASTATIN CALCIUM 10 MG/1
10 TABLET, FILM COATED ORAL DAILY
Qty: 90 TABLET | Refills: 0 | Status: SHIPPED | OUTPATIENT
Start: 2023-11-08 | End: 2023-11-08

## 2023-11-08 RX ORDER — RESPIRATORY SYNCYTIAL VIRUS VACCINE 120MCG/0.5
0.5 KIT INTRAMUSCULAR ONCE
Qty: 1 EACH | Refills: 0 | Status: CANCELLED | OUTPATIENT
Start: 2023-11-08 | End: 2023-11-08

## 2023-11-08 RX ORDER — VALSARTAN 80 MG/1
80 TABLET ORAL DAILY
Qty: 90 TABLET | Refills: 2 | Status: SHIPPED | OUTPATIENT
Start: 2023-11-08 | End: 2024-07-16

## 2023-11-08 ASSESSMENT — ACTIVITIES OF DAILY LIVING (ADL): CURRENT_FUNCTION: NO ASSISTANCE NEEDED

## 2023-11-08 ASSESSMENT — PAIN SCALES - GENERAL: PAINLEVEL: NO PAIN (0)

## 2023-11-08 NOTE — PROGRESS NOTES
"SUBJECTIVE:   Marah is a 83 year old who presents for Preventive Visit.      Are you in the first 12 months of your Medicare coverage?  No    Healthy Habits:     In general, how would you rate your overall health?  Good    Frequency of exercise:  4-5 days/week    Duration of exercise:  15-30 minutes    Do you usually eat at least 4 servings of fruit and vegetables a day, include whole grains    & fiber and avoid regularly eating high fat or \"junk\" foods?  Yes    Taking medications regularly:  Yes    Medication side effects:  None    Ability to successfully perform activities of daily living:  No assistance needed    Home Safety:  No safety concerns identified    Hearing Impairment:  No hearing concerns    In the past 6 months, have you been bothered by leaking of urine? Yes    In general, how would you rate your overall mental or emotional health?  Good    Additional concerns today:  Yes          Have you ever done Advance Care Planning? (For example, a Health Directive, POLST, or a discussion with a medical provider or your loved ones about your wishes): Yes, advance care planning is on file.       Fall risk  Fallen 2 or more times in the past year?: No  Any fall with injury in the past year?: No    Cognitive Screening   1) Repeat 3 items (Leader, Season, Table)    2) Clock draw: NORMAL  3) 3 item recall: Recalls 3 objects  Results: 3 items recalled: COGNITIVE IMPAIRMENT LESS LIKELY    Mini-CogTM Copyright JOE Cadet. Licensed by the author for use in [facility]; reprinted with permission ([email or web address]@.[email or web address]). All rights reserved.      Do you have sleep apnea, excessive snoring or daytime drowsiness? : unknown    Reviewed and updated as needed this visit by clinical staff                  Reviewed and updated as needed this visit by Provider                 Social History     Tobacco Use    Smoking status: Former     Packs/day: 0.50     Years: 2.00     Additional pack years: 0.00     Total pack years: 1.00    "  Types: Cigarettes     Start date: 1961     Quit date: 1963     Years since quittin.5    Smokeless tobacco: Never   Substance Use Topics    Alcohol use: Yes     Comment: 2 glasses of most days             2023     3:32 PM   Alcohol Use   Prescreen: >3 drinks/day or >7 drinks/week? No     Do you have a current opioid prescription? No  Do you use any other controlled substances or medications that are not prescribed by a provider? None      Current providers sharing in care for this patient include:   Patient Care Team:  Arelis High MD as PCP - General (Internal Medicine)  Arelsi High MD as Assigned PCP  Ronnie Crocker DO as Assigned Musculoskeletal Provider  Gokul Lujan MD as Assigned Surgical Provider    The following health maintenance items are reviewed in Epic and correct as of today:  Health Maintenance   Topic Date Due    RSV VACCINE (Pregnancy & 60+) (1 - 1-dose 60+ series) Never done    COVID-19 Vaccine ( season) 2023    ANNUAL REVIEW OF HM ORDERS  10/13/2023    MEDICARE ANNUAL WELLNESS VISIT  10/13/2023    TSH W/FREE T4 REFLEX  10/13/2023    FALL RISK ASSESSMENT  2024    ADVANCE CARE PLANNING  2028    DTAP/TDAP/TD IMMUNIZATION (2 - Td or Tdap) 2029    DEXA  05/10/2033    PHQ-2 (once per calendar year)  Completed    INFLUENZA VACCINE  Completed    Pneumococcal Vaccine: 65+ Years  Completed    ZOSTER IMMUNIZATION  Completed    IPV IMMUNIZATION  Aged Out    HPV IMMUNIZATION  Aged Out    MENINGITIS IMMUNIZATION  Aged Out    RSV MONOCLONAL ANTIBODY  Aged Out    MAMMO SCREENING  Discontinued     Labs reviewed in EPIC    Pertinent mammograms are reviewed under the imaging tab.      Review of Systems  Constitutional, HEENT, cardiovascular, pulmonary, GI, , musculoskeletal, neuro, skin, endocrine and psych systems are negative, except as otherwise noted.    OBJECTIVE:   /77 (BP Location: Right arm, Patient Position: Sitting,  "Cuff Size: Adult Large)   Pulse 56   Temp 98  F (36.7  C) (Oral)   Resp 16   Ht 1.626 m (5' 4\")   Wt 73.9 kg (163 lb)   LMP  (LMP Unknown)   SpO2 96%   Breastfeeding No   BMI 27.98 kg/m   Estimated body mass index is 28.03 kg/m  as calculated from the following:    Height as of 9/22/23: 1.626 m (5' 4\").    Weight as of 9/22/23: 74.1 kg (163 lb 4.8 oz).  Physical Exam  GENERAL: alert and no distress  EYES: Eyes grossly normal to inspection, conjunctivae and sclerae normal  HENT: ear canals and TM's normal, nose and mouth without ulcers or lesions  NECK: no asymmetry  RESP: lungs clear to auscultation  CV: regular rate and rhythm  ABDOMEN: soft, nontender, bowel sounds normal  MS: no gross musculoskeletal defects noted, no edema  SKIN: no suspicious lesions or rashes  NEURO: Normal strength and tone, mentation intact and speech normal  PSYCH: mentation appears normal, affect normal/bright    Diagnostic Test Results:  Labs reviewed in Epic    ASSESSMENT / PLAN:   Marah was seen today for physical.    Diagnoses and all orders for this visit:    Encounter for Medicare annual wellness exam    Hypothyroidism, unspecified type  -     TSH WITH FREE T4 REFLEX; Future    Hyperlipidemia LDL goal <100  -     Lipid panel reflex to direct LDL Fasting; Future  -     Discontinue: valsartan (DIOVAN) 80 MG tablet; Take 1 tablet (80 mg) by mouth daily  -     Discontinue: atorvastatin (LIPITOR) 10 MG tablet; Take 1 tablet (10 mg) by mouth daily  -     valsartan (DIOVAN) 80 MG tablet; Take 1 tablet (80 mg) by mouth daily  -     atorvastatin (LIPITOR) 10 MG tablet; Take 1 tablet (10 mg) by mouth daily    Screening for diabetes mellitus  -     Hemoglobin A1c; Future    Other orders  -     REVIEW OF HEALTH MAINTENANCE PROTOCOL ORDERS  -     PRIMARY CARE FOLLOW-UP SCHEDULING; Future        Patient has been advised of split billing requirements and indicates understanding: Yes      COUNSELING:  Reviewed preventive health counseling, " "as reflected in patient instructions  Special attention given to:       Regular exercise       Healthy diet/nutrition      BMI:   Estimated body mass index is 28.03 kg/m  as calculated from the following:    Height as of 9/22/23: 1.626 m (5' 4\").    Weight as of 9/22/23: 74.1 kg (163 lb 4.8 oz).   Weight management plan: Discussed healthy diet and exercise guidelines      She reports that she quit smoking about 60 years ago. Her smoking use included cigarettes. She started smoking about 62 years ago. She has a 1.00 pack-year smoking history. She has never used smokeless tobacco.      Appropriate preventive services were discussed with this patient, including applicable screening as appropriate for fall prevention, nutrition, physical activity, Tobacco-use cessation, weight loss and cognition.  Checklist reviewing preventive services available has been given to the patient.    Reviewed patients plan of care and provided an AVS. The Basic Care Plan (routine screening as documented in Health Maintenance) for Marah meets the Care Plan requirement. This Care Plan has been established and reviewed with the Patient.          Arelis High MD  [facility]    Identified Health Risks:  I have reviewed Opioid Use Disorder and Substance Use Disorder risk factors and made any needed referrals. Information on urinary incontinence and treatment options given to patient.  "

## 2023-11-08 NOTE — PATIENT INSTRUCTIONS
Patient Education   Personalized Prevention Plan  You are due for the preventive services outlined below.  Your care team is available to assist you in scheduling these services.  If you have already completed any of these items, please share that information with your care team to update in your medical record.  Health Maintenance Due   Topic Date Due     RSV VACCINE (Pregnancy & 60+) (1 - 1-dose 60+ series) Never done     Thyroid Function Lab  10/13/2023     Bladder Training: Care Instructions  Your Care Instructions     Bladder training is used to treat urge incontinence and stress incontinence. Urge incontinence means that the need to urinate comes on so fast that you can't get to a toilet in time. Stress incontinence means that you leak urine because of pressure on your bladder. For example, it may happen when you laugh, cough, or lift something heavy.  Bladder training can increase how long you can wait before you have to urinate. It can also help your bladder hold more urine. And it can give you better control over the urge to urinate.  It is important to remember that bladder training takes a few weeks to a few months to make a difference. You may not see results right away, but don't give up.  Follow-up care is a key part of your treatment and safety. Be sure to make and go to all appointments, and call your doctor if you are having problems. It's also a good idea to know your test results and keep a list of the medicines you take.  How can you care for yourself at home?  Work with your doctor to come up with a bladder training program that is right for you. You may use one or more of the following methods.  Delayed urination  In the beginning, try to keep from urinating for 5 minutes after you first feel the need to go.  While you wait, take deep, slow breaths to relax. Kegel exercises can also help you delay the need to go to the bathroom.  After some practice, when you can easily wait 5 minutes to  "urinate, try to wait 10 minutes before you urinate.  Slowly increase the waiting period until you are able to control when you have to urinate.  Scheduled urination  Empty your bladder when you first wake up in the morning.  Schedule times throughout the day when you will urinate.  Start by going to the bathroom every hour, even if you don't need to go.  Slowly increase the time between trips to the bathroom.  When you have found a schedule that works well for you, keep doing it.  If you wake up during the night and have to urinate, do it. Apply your schedule to waking hours only.  Kegel exercises  These tighten and strengthen pelvic muscles, which can help you control the flow of urine. (If doing these exercises causes pain, stop doing them and talk with your doctor.) To do Kegel exercises:  Squeeze your muscles as if you were trying not to pass gas. Or squeeze your muscles as if you were stopping the flow of urine. Your belly, legs, and buttocks shouldn't move.  Hold the squeeze for 3 seconds, then relax for 5 to 10 seconds.  Start with 3 seconds, then add 1 second each week until you are able to squeeze for 10 seconds.  Repeat the exercise 10 times a session. Do 3 to 8 sessions a day.  When should you call for help?  Watch closely for changes in your health, and be sure to contact your doctor if:    Your incontinence is getting worse.     You do not get better as expected.   Where can you learn more?  Go to https://www.[employer].net/patiented  Enter V684 in the search box to learn more about \"Bladder Training: Care Instructions.\"  Current as of: February 28, 2023               Content Version: 13.8    1815-6540 [email or web address].   Care instructions adapted under license by your healthcare professional. If you have questions about a medical condition or this instruction, always ask your healthcare professional. [email or web address] disclaims any warranty or liability for your use of this " information.

## 2023-11-09 RX ORDER — LEVOTHYROXINE SODIUM 88 UG/1
TABLET ORAL
Qty: 90 TABLET | Refills: 10 | Status: SHIPPED | OUTPATIENT
Start: 2023-11-09

## 2024-01-16 ENCOUNTER — TELEPHONE (OUTPATIENT)
Dept: SURGERY | Facility: CLINIC | Age: 84
End: 2024-01-16
Payer: MEDICARE

## 2024-01-16 NOTE — TELEPHONE ENCOUNTER
Patient mailed [employer] from to our office for prescription drug claim form for member reimbursement.    Medication name not listed.    Called patient and left message, encouraging her to call back to provide me with the name of the medication that the form is intended form    Call back number provided    Nohemi Johnson RN-BSN

## 2024-01-18 NOTE — TELEPHONE ENCOUNTER
Patient returning call to discuss. She reports she does not have Part D. [employer] coverage is not through medicare    Suggested that she contact [employer] to see if they paid a part of the medications. Aprepitant is likely a specialty medication and it could be that [employer] paid a portion of the cost of it and patient was responsible for the remainder. She says this makes sense    She will call [employer] to discuss    She will call back if she needs any further assistance with this    Nohemi Johnson RN-BSN

## 2024-01-18 NOTE — TELEPHONE ENCOUNTER
Called patient and left message informing her that I spoke with both Juan M and the  business office today. Ericajuan reports those two medications are covered on her plan. Per the Business Office, they do not submit anything to Medicare Part D (surgery wise). Since meds were given in pre op/pacu they would not have been submitted.    Patient should call the Business Office and ask for an itemized bill that she should then send to her Part D carrier for reimbursement of the two meds used in pre op and recovery    Provided patient with phone number to the Business Office    Encouraged her to call PRN with any further questions or concerns and I will do my best to help. Provided her with my personal call back number    Nohemi Johnson RN-BSN

## 2024-04-11 ENCOUNTER — ANCILLARY PROCEDURE (OUTPATIENT)
Dept: CT IMAGING | Facility: CLINIC | Age: 84
End: 2024-04-11
Attending: INTERNAL MEDICINE
Payer: MEDICARE

## 2024-04-11 DIAGNOSIS — Z85.820 PERSONAL HISTORY OF MALIGNANT MELANOMA OF SKIN: ICD-10-CM

## 2024-04-11 DIAGNOSIS — E78.5 HYPERLIPIDEMIA LDL GOAL <100: ICD-10-CM

## 2024-04-11 LAB
CREAT BLD-MCNC: 0.8 MG/DL (ref 0.5–1)
EGFRCR SERPLBLD CKD-EPI 2021: >60 ML/MIN/1.73M2

## 2024-04-11 PROCEDURE — 250N000009 HC RX 250: Performed by: INTERNAL MEDICINE

## 2024-04-11 PROCEDURE — 70491 CT SOFT TISSUE NECK W/DYE: CPT

## 2024-04-11 PROCEDURE — 82565 ASSAY OF CREATININE: CPT

## 2024-04-11 PROCEDURE — 250N000011 HC RX IP 250 OP 636: Performed by: INTERNAL MEDICINE

## 2024-04-11 PROCEDURE — 71260 CT THORAX DX C+: CPT

## 2024-04-11 RX ORDER — ATORVASTATIN CALCIUM 10 MG/1
10 TABLET, FILM COATED ORAL DAILY
Qty: 90 TABLET | Refills: 1 | Status: SHIPPED | OUTPATIENT
Start: 2024-04-11

## 2024-04-11 RX ORDER — IOPAMIDOL 755 MG/ML
117 INJECTION, SOLUTION INTRAVASCULAR ONCE
Status: COMPLETED | OUTPATIENT
Start: 2024-04-11 | End: 2024-04-11

## 2024-04-11 RX ADMIN — IOPAMIDOL 117 ML: 755 INJECTION, SOLUTION INTRAVENOUS at 11:11

## 2024-04-11 RX ADMIN — SODIUM CHLORIDE 40 ML: 9 INJECTION, SOLUTION INTRAVENOUS at 11:11

## 2024-04-17 ENCOUNTER — TRANSFERRED RECORDS (OUTPATIENT)
Dept: HEALTH INFORMATION MANAGEMENT | Facility: CLINIC | Age: 84
End: 2024-04-17
Payer: MEDICARE

## 2024-05-20 ENCOUNTER — TELEPHONE (OUTPATIENT)
Dept: FAMILY MEDICINE | Facility: CLINIC | Age: 84
End: 2024-05-20
Payer: MEDICARE

## 2024-05-20 NOTE — TELEPHONE ENCOUNTER
Reason for Call:  Appointment Request    Patient requesting this type of appt: Pre-op    Requested provider: Arelis High    Reason patient unable to be scheduled: Not within requested timeframe    When does patient want to be seen/preferred time:  anytime asap but not this week    Comments: patient wondering if she could be worked in.     Could we send this information to you in [facility] or would you prefer to receive a phone call?:   Patient would prefer a phone call   Okay to leave a detailed message?: Yes at Home number on file [phone] (home)    Call taken on 5/20/2024 at 8:37 AM by Livier El

## 2024-05-30 ENCOUNTER — OFFICE VISIT (OUTPATIENT)
Dept: FAMILY MEDICINE | Facility: CLINIC | Age: 84
End: 2024-05-30
Payer: MEDICARE

## 2024-05-30 VITALS
WEIGHT: 152.3 LBS | DIASTOLIC BLOOD PRESSURE: 81 MMHG | RESPIRATION RATE: 16 BRPM | HEART RATE: 54 BPM | OXYGEN SATURATION: 99 % | BODY MASS INDEX: 26 KG/M2 | SYSTOLIC BLOOD PRESSURE: 134 MMHG | TEMPERATURE: 96 F | HEIGHT: 64 IN

## 2024-05-30 DIAGNOSIS — M81.0 AGE-RELATED OSTEOPOROSIS WITHOUT CURRENT PATHOLOGICAL FRACTURE: ICD-10-CM

## 2024-05-30 DIAGNOSIS — H25.9 AGE-RELATED CATARACT OF BOTH EYES, UNSPECIFIED AGE-RELATED CATARACT TYPE: ICD-10-CM

## 2024-05-30 DIAGNOSIS — Z01.810 PRE-OPERATIVE CARDIOVASCULAR EXAMINATION: Primary | ICD-10-CM

## 2024-05-30 PROCEDURE — 99214 OFFICE O/P EST MOD 30 MIN: CPT | Performed by: INTERNAL MEDICINE

## 2024-05-30 ASSESSMENT — PAIN SCALES - GENERAL: PAINLEVEL: NO PAIN (0)

## 2024-05-30 NOTE — PROGRESS NOTES
Preoperative Evaluation  [facility]  [address] [place] [address], [address]  [facility] [address]  Phone: [phone]  Primary Provider: Arelis High MD  Pre-op Performing Provider: Arelis High MD  May 30, 2024           5/26/2024   Surgical Information   What procedure is being done? Cataract surgery   Facility or Hospital where procedure/surgery will be performed: [facility]   Who is doing the procedure / surgery? Dr. Roberts   Date of surgery / procedure: June 4 (left eye) & June 11, 2024 (right eye)    Time of surgery / procedure: Unknown yet   Where do you plan to recover after surgery? at home with family     Fax number for surgical facility: [phone]    Assessment & Plan     The proposed surgical procedure is considered LOW risk.    Pre-operative cardiovascular examination  Age-related cataract of both eyes, unspecified age-related cataract type     - No identified additional risk factors other than previously addressed    Antiplatelet or Anticoagulation Medication Instructions   - Patient is on no antiplatelet or anticoagulation medications.    Additional Medication Instructions  Take all scheduled medications on the day of surgery    Recommendation  Approval given to proceed with proposed procedure, without further diagnostic evaluation.        Ovidio Crystal is a 84 year old, presenting for the following:  Pre-Op Exam        HPI related to upcoming procedure: laurie Mccormick is a very pleasant 84 year old female with bilateral cataracts who presents to internal medicine clinic for a pre op cardiac evaluation for upcoming bilateral eye cataracts surgeries. No chest pain, headaches, fever or chills.         5/26/2024   Pre-Op Questionnaire   Have you ever had a heart attack or stroke? No   Have you ever had surgery on your heart or blood vessels, such as a stent placement, a coronary artery bypass, or surgery on an artery in your head, neck, heart, or legs? No   Do you have chest  pain with activity? No   Do you have a history of heart failure? No   Do you currently have a cold, bronchitis or symptoms of other infection? No   Do you have a cough, shortness of breath, or wheezing? No   Do you or anyone in your family have previous history of blood clots? No   Do you or does anyone in your family have a serious bleeding problem such as prolonged bleeding following surgeries or cuts? No   Have you ever had problems with anemia or been told to take iron pills? No   Have you had any abnormal blood loss such as black, tarry or bloody stools, or abnormal vaginal bleeding? No   Have you ever had a blood transfusion? No   Are you willing to have a blood transfusion if it is medically needed before, during, or after your surgery? Yes   Have you or any of your relatives ever had problems with anesthesia? No   Do you have sleep apnea, excessive snoring or daytime drowsiness? No   Do you have any artifical heart valves or other implanted medical devices like a pacemaker, defibrillator, or continuous glucose monitor? No   Do you have artificial joints? No   Are you allergic to latex? 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     Priority: Medium    Hyperlipidemia LDL goal <100 10/22/2021     Priority: Medium    Hypothyroidism, unspecified type 10/22/2021     Priority: Medium      Past Medical History:   Diagnosis Date    Cancer (H) 12/2019    Had melanoma & had surgery  on December 18 2119    Hyperlipidemia LDL goal <100     Hypertension 01/2015    Taking Valsartan 80 Mg 1 daily    Thyroid disease 2000    Taking Levothyroxine 88 mg 1 day in morning     Past Surgical History:   Procedure Laterality Date     BIOPSY  2019    Melanoma    COLONOSCOPY  2019    DAVINCI HERNIORRHAPHY VENTRAL Left 2023    Procedure: robot assisted ventral hernia repair with mesh;  Surgeon: Gokul Lujan MD;  Location:  OR    VASCULAR SURGERY  2013    Varicose vein surgery     Current Outpatient Medications   Medication Sig Dispense Refill    acetaminophen (TYLENOL) 325 MG tablet Take 2 tablets (650 mg) by mouth every 6 hours as needed for pain 60 tablet 0    atorvastatin (LIPITOR) 10 MG tablet TAKE 1 TABLET EVERY DAY 90 tablet 1    ketoconazole (NIZORAL) 2 % external shampoo Apply topically daily as needed for irritation or itching      latanoprost (XALATAN) 0.005 % ophthalmic solution instill 1 drop into each eye at bedtime      levothyroxine (SYNTHROID/LEVOTHROID) 88 MCG tablet TAKE 1 TABLET EVERY DAY 90 tablet 10    methocarbamol (ROBAXIN) 500 MG tablet Take 0.5-1 tablets (250-500 mg) by mouth 3 times daily as needed for muscle spasms (pain) 15 tablet 0    neomycin-polymyxin-dexamethasone (MAXITROL) 3.5-20801-3.1 ophthalmic ointment Place 0.5 inches into both eyes as needed      oxyCODONE (ROXICODONE) 5 MG tablet Take 0.5-1 tablets (2.5-5 mg) by mouth every 4 hours as needed for moderate to severe pain 10 tablet 0    senna-docusate (SENOKOT-S/PERICOLACE) 8.6-50 MG tablet Take 1-2 tablets by mouth 2 times daily as needed for constipation 15 tablet 0    valsartan (DIOVAN) 80 MG tablet Take 1 tablet (80 mg) by mouth daily 90 tablet 2       Allergies   Allergen Reactions    No Known Allergies         Social History     Tobacco Use    Smoking status: Former     Current packs/day: 0.00     Average packs/day: 0.5 packs/day for 2.4 years (1.2 ttl pk-yrs)     Types: Cigarettes     Start date: 1961     Quit date: 1963     Years since quittin.0    Smokeless tobacco: Never   Substance Use Topics    Alcohol use: Yes     Comment: 2 glasses of most days     Family History   Problem Relation Age of Onset     "Hypertension Brother     Hypertension Sister     Hyperlipidemia Sister     Other Cancer Sister         CLL    Substance Abuse Sister         In recovery    Substance Abuse Father              Substance Abuse Brother         In recovery    Osteoporosis Brother     Substance Abuse Daughter         In & out of recovery    Substance Abuse Son         In recovery    Other Cancer Son         One kidney removed 2 yrs ago at [place] . Existing kidney functions good     History   Drug Use Unknown             Review of Systems  Constitutional, HEENT, cardiovascular, pulmonary, GI, , musculoskeletal, neuro, skin, endocrine and psych systems are negative, except as otherwise noted.    Objective    /81 (BP Location: Right arm, Patient Position: Sitting, Cuff Size: Adult Regular)   Pulse 54   Temp (!) 96  F (35.6  C) (Temporal)   Resp 16   Ht 1.626 m (5' 4\")   Wt 69.1 kg (152 lb 4.8 oz)   LMP  (LMP Unknown)   SpO2 99%   BMI 26.14 kg/m     Estimated body mass index is 26.14 kg/m  as calculated from the following:    Height as of this encounter: 1.626 m (5' 4\").    Weight as of this encounter: 69.1 kg (152 lb 4.8 oz).  Physical Exam  GENERAL: alert and no distress  EYES: Eyes grossly normal to inspection, conjunctivae and sclerae normal  HENT: ear canals and TM's normal, nose and mouth without ulcers or lesions  NECK: no asymmetry  RESP: lungs clear to auscultation - no rales, rhonchi or wheezes  CV: regular rate and rhythm, normal S1 S2  ABDOMEN: soft, nontender, bowel sounds normal  MS: no gross musculoskeletal defects noted, no edema  SKIN: no suspicious lesions or rashes  NEURO: Normal strength and tone, mentation intact and speech normal  PSYCH: mentation appears normal, affect normal/bright    Recent Labs   Lab Test 24  1120 23  0923 23  0756   HGB  --   --  15.5   PLT  --   --  185   CR 0.8  --   --    A1C  --  5.3  --         Diagnostics    No EKG required for low risk surgery " (cataract, skin procedure, breast biopsy, etc).    Revised Cardiac Risk Index (RCRI)  The patient has the following serious cardiovascular risks for perioperative complications:   - No serious cardiac risks = 0 points     RCRI Interpretation: 0 points: Class I (very low risk - 0.4% complication rate)         Signed Electronically by: Arelis High MD  Copy of this evaluation report is provided to requesting physician.

## 2024-07-16 DIAGNOSIS — E78.5 HYPERLIPIDEMIA LDL GOAL <100: ICD-10-CM

## 2024-07-16 RX ORDER — VALSARTAN 80 MG/1
80 TABLET ORAL DAILY
Qty: 90 TABLET | Refills: 1 | Status: SHIPPED | OUTPATIENT
Start: 2024-07-16

## 2024-08-21 ENCOUNTER — HOSPITAL ENCOUNTER (OUTPATIENT)
Dept: MAMMOGRAPHY | Facility: CLINIC | Age: 84
Discharge: HOME | End: 2024-08-21
Attending: INTERNAL MEDICINE | Admitting: INTERNAL MEDICINE
Payer: MEDICARE

## 2024-08-21 DIAGNOSIS — Z12.31 VISIT FOR SCREENING MAMMOGRAM: ICD-10-CM

## 2024-08-21 PROCEDURE — 77063 BREAST TOMOSYNTHESIS BI: CPT

## 2024-09-24 ENCOUNTER — TELEPHONE (OUTPATIENT)
Dept: FAMILY MEDICINE | Facility: CLINIC | Age: 84
End: 2024-09-24
Payer: MEDICARE

## 2024-09-24 NOTE — TELEPHONE ENCOUNTER
Pt called back. Appt scheduled as requested.     Nov 21, 2024 10:30 AM  (Arrive by 10:10 AM)  Adult Preventative Visit with Arelis High MD  [facility] ([facility] ) [phone]

## 2024-09-24 NOTE — TELEPHONE ENCOUNTER
Reason for Call:  Appointment Request    Patient requesting this type of appt:  Preventive     Requested provider: Arelis High    Reason patient unable to be scheduled: Not within requested timeframe    When does patient want to be seen/preferred time:  on/after November 11th    Comments: Ideally would like to see Dr High but is open to seeing other providers    Could we send this information to you in [facility] or would you prefer to receive a phone call?:   Patient would prefer a phone call   Okay to leave a detailed message?: Yes at Cell number on file:    Telephone Information:   Mobile [phone]       Call taken on 9/24/2024 at 11:30 AM by Danna Saldivar

## 2024-10-01 ENCOUNTER — IMMUNIZATION (OUTPATIENT)
Dept: FAMILY MEDICINE | Facility: CLINIC | Age: 84
End: 2024-10-01
Payer: MEDICARE

## 2024-10-01 DIAGNOSIS — Z23 HIGH PRIORITY FOR 2019-NCOV VACCINE: Primary | ICD-10-CM

## 2024-10-01 PROCEDURE — 99207 PR NO CHARGE NURSE ONLY: CPT

## 2024-10-01 PROCEDURE — 91320 SARSCV2 VAC 30MCG TRS-SUC IM: CPT

## 2024-10-01 PROCEDURE — 90480 ADMN SARSCOV2 VAC 1/ONLY CMP: CPT

## 2024-10-15 ENCOUNTER — IMMUNIZATION (OUTPATIENT)
Dept: FAMILY MEDICINE | Facility: CLINIC | Age: 84
End: 2024-10-15
Payer: MEDICARE

## 2024-10-15 DIAGNOSIS — Z23 NEED FOR PROPHYLACTIC VACCINATION AND INOCULATION AGAINST INFLUENZA: Primary | ICD-10-CM

## 2024-10-15 PROCEDURE — 90662 IIV NO PRSV INCREASED AG IM: CPT

## 2024-10-15 PROCEDURE — G0008 ADMIN INFLUENZA VIRUS VAC: HCPCS

## 2024-11-08 SDOH — HEALTH STABILITY: PHYSICAL HEALTH: ON AVERAGE, HOW MANY DAYS PER WEEK DO YOU ENGAGE IN MODERATE TO STRENUOUS EXERCISE (LIKE A BRISK WALK)?: 6 DAYS

## 2024-11-08 SDOH — HEALTH STABILITY: PHYSICAL HEALTH: ON AVERAGE, HOW MANY MINUTES DO YOU ENGAGE IN EXERCISE AT THIS LEVEL?: 30 MIN

## 2024-11-08 ASSESSMENT — SOCIAL DETERMINANTS OF HEALTH (SDOH): HOW OFTEN DO YOU GET TOGETHER WITH FRIENDS OR RELATIVES?: TWICE A WEEK

## 2024-11-13 ENCOUNTER — OFFICE VISIT (OUTPATIENT)
Dept: FAMILY MEDICINE | Facility: CLINIC | Age: 84
End: 2024-11-13
Payer: MEDICARE

## 2024-11-13 VITALS
WEIGHT: 155 LBS | SYSTOLIC BLOOD PRESSURE: 134 MMHG | RESPIRATION RATE: 16 BRPM | HEIGHT: 64 IN | TEMPERATURE: 97.7 F | HEART RATE: 58 BPM | DIASTOLIC BLOOD PRESSURE: 81 MMHG | OXYGEN SATURATION: 97 % | BODY MASS INDEX: 26.46 KG/M2

## 2024-11-13 DIAGNOSIS — E03.9 HYPOTHYROIDISM, UNSPECIFIED TYPE: ICD-10-CM

## 2024-11-13 DIAGNOSIS — Z00.00 ENCOUNTER FOR SUBSEQUENT ANNUAL WELLNESS VISIT (AWV) IN MEDICARE PATIENT: Primary | ICD-10-CM

## 2024-11-13 DIAGNOSIS — E78.5 HYPERLIPIDEMIA LDL GOAL <100: ICD-10-CM

## 2024-11-13 PROBLEM — C43.4 MELANOMA OF SCALP (H): Status: RESOLVED | Noted: 2022-10-13 | Resolved: 2024-11-13

## 2024-11-13 LAB
CHOLEST SERPL-MCNC: 144 MG/DL
FASTING STATUS PATIENT QL REPORTED: YES
HDLC SERPL-MCNC: 56 MG/DL
LDLC SERPL CALC-MCNC: 77 MG/DL
NONHDLC SERPL-MCNC: 88 MG/DL
TRIGL SERPL-MCNC: 53 MG/DL
TSH SERPL DL<=0.005 MIU/L-ACNC: 3.55 UIU/ML (ref 0.3–4.2)

## 2024-11-13 PROCEDURE — G0439 PPPS, SUBSEQ VISIT: HCPCS | Performed by: INTERNAL MEDICINE

## 2024-11-13 PROCEDURE — 80061 LIPID PANEL: CPT | Performed by: INTERNAL MEDICINE

## 2024-11-13 PROCEDURE — 84443 ASSAY THYROID STIM HORMONE: CPT | Performed by: INTERNAL MEDICINE

## 2024-11-13 PROCEDURE — 36415 COLL VENOUS BLD VENIPUNCTURE: CPT | Performed by: INTERNAL MEDICINE

## 2024-11-13 RX ORDER — LEVOTHYROXINE SODIUM 88 UG/1
88 TABLET ORAL DAILY
Qty: 90 TABLET | Refills: 10 | Status: SHIPPED | OUTPATIENT
Start: 2024-11-13

## 2024-11-13 ASSESSMENT — PAIN SCALES - GENERAL: PAINLEVEL_OUTOF10: NO PAIN (0)

## 2024-11-13 NOTE — PROGRESS NOTES
"Preventive Care Visit  [facility]  Arelis High MD, Internal Medicine  Nov 13, 2024      Assessment & Plan     Encounter for subsequent annual wellness visit (AWV) in Medicare patient  - diet, exercise    Hypothyroidism, unspecified type  - stable  - levothyroxine (SYNTHROID/LEVOTHROID) 88 MCG tablet  Dispense: 90 tablet; Refill: 10  - TSH with free T4 reflex    Hyperlipidemia LDL goal <100  - Lipid panel reflex to direct LDL Fasting      Patient has been advised of split billing requirements and indicates understanding: Yes        BMI  Estimated body mass index is 26.61 kg/m  as calculated from the following:    Height as of this encounter: 1.626 m (5' 4\").    Weight as of this encounter: 70.3 kg (155 lb).   Weight management plan: Discussed healthy diet and exercise guidelines    Counseling  Appropriate preventive services were addressed with this patient via screening, questionnaire, or discussion as appropriate for fall prevention, nutrition, physical activity, Tobacco-use cessation, social engagement, weight loss and cognition.  Checklist reviewing preventive services available has been given to the patient.  Reviewed patient's diet, addressing concerns and/or questions.   The patient was provided with written information regarding signs of hearing loss.   Information on urinary incontinence and treatment options given to patient.       FUTURE APPOINTMENTS:       - Follow-up visit in 1 year    Ovidio Crystal is a 84 year old, presenting for the following:        HPI    Health Care Directive  Patient has a Health Care Directive on file  Advance care planning document is on file and is current.      11/8/2024   General Health   How would you rate your overall physical health? Good   Feel stress (tense, anxious, or unable to sleep) Only a little      (!) STRESS CONCERN      11/8/2024   Nutrition   Diet: Regular (no restrictions)            11/8/2024   Exercise   Days per week of " moderate/strenous exercise 6 days   Average minutes spent exercising at this level 30 min            11/8/2024   Social Factors   Frequency of gathering with friends or relatives Twice a week   Worry food won't last until get money to buy more No   Food not last or not have enough money for food? No   Do you have housing? (Housing is defined as stable permanent housing and does not include staying ouside in a car, in a tent, in an abandoned building, in an overnight shelter, or couch-surfing.) Yes   Are you worried about losing your housing? No   Lack of transportation? No   Unable to get utilities (heat,electricity)? No            11/8/2024   Fall Risk   Fallen 2 or more times in the past year? No     No    Trouble with walking or balance? No     No        Patient-reported    Multiple values from one day are sorted in reverse-chronological order          11/8/2024   Activities of Daily Living- Home Safety   Needs help with the following daily activites None of the above   Safety concerns in the home None of the above            11/8/2024   Dental   Dentist two times every year? Yes            11/8/2024   Hearing Screening   Hearing concerns? (!) IT'S HARD TO FOLLOW A CONVERSATION IN A NOISY RESTAURANT OR CROWDED ROOM.            11/8/2024   Driving Risk Screening   Patient/family members have concerns about driving No            11/8/2024   General Alertness/Fatigue Screening   Have you been more tired than usual lately? No            11/8/2024   Urinary Incontinence Screening   Bothered by leaking urine in past 6 months Yes            11/8/2024   TB Screening   Were you born outside of the [place]? No            Today's PHQ-2 Score:       11/13/2024     7:42 AM   PHQ-2 ( 1999 [employer])   Q1: Little interest or pleasure in doing things 0    Q2: Feeling down, depressed or hopeless 0    PHQ-2 Score 0    Q1: Little interest or pleasure in doing things Not at all   Q2: Feeling down, depressed or hopeless Not at all   PHQ-2  Score 0       Patient-reported           2024   Substance Use   Alcohol more than 3/day or more than 7/wk No   Do you have a current opioid prescription? No   How severe/bad is pain from 1 to 10? 1/10   Do you use any other substances recreationally? No        Social History     Tobacco Use    Smoking status: Former     Current packs/day: 0.00     Average packs/day: 0.5 packs/day for 2.4 years (1.2 ttl pk-yrs)     Types: Cigarettes     Start date: 1961     Quit date: 1963     Years since quittin.5    Smokeless tobacco: Never   Vaping Use    Vaping status: Never Used   Substance Use Topics    Alcohol use: Yes     Comment: 2 glasses of most days    Drug use: Never           2024   LAST FHS-7 RESULTS   1st degree relative breast or ovarian cancer No   Any relative bilateral breast cancer No   Any male have breast cancer No   Any ONE woman have BOTH breast AND ovarian cancer No   Any woman with breast cancer before 50yrs No   2 or more relatives with breast AND/OR ovarian cancer No   2 or more relatives with breast AND/OR bowel cancer No        Mammogram Screening - After age 74- determine frequency with patient based on health status, life expectancy and patient goals      Reviewed and updated as needed this visit by Provider                    Past Medical History:   Diagnosis Date    Cancer (H) 2019    Had melanoma & had surgery  on 2119    Hyperlipidemia LDL goal <100     Hypertension 2015    Taking Valsartan 80 Mg 1 daily    Thyroid disease     Taking Levothyroxine 88 mg 1 day in morning     Current providers sharing in care for this patient include:  Patient Care Team:  Arelis High MD as PCP - General (Internal Medicine)  Arelis High MD as Assigned PCP  Gokul Lujan MD as Assigned Surgical Provider    The following health maintenance items are reviewed in Epic and correct as of today:  Health Maintenance   Topic Date Due    RSV VACCINE (  "1-dose 75+ series) Never done    BMP  10/13/2023    LIPID  11/08/2024    ANNUAL REVIEW OF HM ORDERS  11/08/2024    MEDICARE ANNUAL WELLNESS VISIT  11/08/2024    TSH W/FREE T4 REFLEX  11/08/2024    FALL RISK ASSESSMENT  11/13/2025    ADVANCE CARE PLANNING  11/08/2028    DTAP/TDAP/TD IMMUNIZATION (2 - Td or Tdap) 07/03/2029    DEXA  05/10/2033    PHQ-2 (once per calendar year)  Completed    INFLUENZA VACCINE  Completed    Pneumococcal Vaccine: 65+ Years  Completed    ZOSTER IMMUNIZATION  Completed    COVID-19 Vaccine  Completed    HPV IMMUNIZATION  Aged Out    MENINGITIS IMMUNIZATION  Aged Out    RSV MONOCLONAL ANTIBODY  Aged Out    MAMMO SCREENING  Discontinued         Review of Systems  Constitutional, HEENT, cardiovascular, pulmonary, GI, , musculoskeletal, neuro, skin, endocrine and psych systems are negative, except as otherwise noted.     Objective    Exam  /81 (BP Location: Right arm, Patient Position: Sitting, Cuff Size: Adult Regular)   Pulse 58   Temp 97.7  F (36.5  C) (Temporal)   Resp 16   Ht 1.626 m (5' 4\")   Wt 70.3 kg (155 lb)   LMP  (LMP Unknown)   SpO2 97%   BMI 26.61 kg/m     Estimated body mass index is 26.14 kg/m  as calculated from the following:    Height as of 5/30/24: 1.626 m (5' 4\").    Weight as of 5/30/24: 69.1 kg (152 lb 4.8 oz).    Physical Exam  GENERAL: alert and no distress  EYES: Eyes grossly normal to inspection, conjunctivae and sclerae normal  HENT: ear canals and TM's normal, nose and mouth without ulcers or lesions  NECK: no asymmetry  RESP: lungs clear to auscultation - no rales, rhonchi or wheezes  CV: regular rate and rhythm, normal S1 S2  ABDOMEN: soft, nontender, bowel sounds normal  MS: no gross musculoskeletal defects noted, no edema  SKIN: no suspicious lesions or rashes  NEURO: Normal strength and tone, mentation intact and speech normal  PSYCH: mentation appears normal, affect normal/bright        11/13/2024   Mini Cog   Clock Draw Score 2 Normal   3 Item " Recall 3 objects recalled   Mini Cog Total Score 5        Signed Electronically by: Arelis High MD

## 2024-11-30 ENCOUNTER — NURSE TRIAGE (OUTPATIENT)
Dept: NURSING | Facility: CLINIC | Age: 84
End: 2024-11-30
Payer: MEDICARE

## 2024-11-30 NOTE — TELEPHONE ENCOUNTER
"Pt currently in [place].  \"Stood on feet a long time on Thanksgiving Day.\"  \"Then new pain in calf onsetting yesterday.\"  Asks if otc analgesics would be sufficient.    Advised prompt eval at the nearest medical center.  Pt verbalizes understanding.  Daughter agrees to facilitate.    Heike Charles RN  [facility] Nurse Advisor     Reason for Disposition   General information question, no triage required and triager able to answer question    Protocols used: Information Only Call - No Triage-A-    "

## 2024-12-04 ENCOUNTER — OFFICE VISIT (OUTPATIENT)
Dept: FAMILY MEDICINE | Facility: CLINIC | Age: 84
End: 2024-12-04
Payer: MEDICARE

## 2024-12-04 VITALS
TEMPERATURE: 97.7 F | SYSTOLIC BLOOD PRESSURE: 155 MMHG | DIASTOLIC BLOOD PRESSURE: 90 MMHG | BODY MASS INDEX: 27.14 KG/M2 | WEIGHT: 159 LBS | HEART RATE: 54 BPM | HEIGHT: 64 IN | OXYGEN SATURATION: 98 % | RESPIRATION RATE: 16 BRPM

## 2024-12-04 DIAGNOSIS — R00.1 SINUS BRADYCARDIA: Primary | ICD-10-CM

## 2024-12-04 DIAGNOSIS — E78.5 HYPERLIPIDEMIA LDL GOAL <100: ICD-10-CM

## 2024-12-04 DIAGNOSIS — I10 BENIGN ESSENTIAL HYPERTENSION: ICD-10-CM

## 2024-12-04 DIAGNOSIS — E03.9 HYPOTHYROIDISM, UNSPECIFIED TYPE: ICD-10-CM

## 2024-12-04 PROCEDURE — G2211 COMPLEX E/M VISIT ADD ON: HCPCS | Performed by: INTERNAL MEDICINE

## 2024-12-04 PROCEDURE — 99214 OFFICE O/P EST MOD 30 MIN: CPT | Performed by: INTERNAL MEDICINE

## 2024-12-04 ASSESSMENT — PAIN SCALES - GENERAL: PAINLEVEL_OUTOF10: NO PAIN (0)

## 2024-12-04 NOTE — PROGRESS NOTES
{PROVIDER CHARTING PREFERENCE:415686}    Ovidio Crystal is a 84 year old, presenting for the following health issues:  No chief complaint on file.  {(!) Visit Details have not yet been documented.  Please enter Visit Details and then use this list to pull in documentation. (Optional):703907}  HPI     {MA/LPN/RN Pre-Provider Visit Orders- hCG/UA/Strep (Optional):268939}  ED/UC Followup:    Facility:  [facility] Emergency Department   Date of visit: 11/30/2024  Reason for visit:     Muscle strain of right lower leg, initial encounter (Primary Dx)  Discharge Disposition: Home or Self Care     Current Status:   {additonal problems for provider to add (Optional):101257}    {ROS Picklists (Optional):468574}      Objective    LMP  (LMP Unknown)   There is no height or weight on file to calculate BMI.  Physical Exam   {Exam List (Optional):022074}    {Diagnostic Test Results (Optional):880330}        Signed Electronically by: Arelis High MD  {Email feedback regarding this note to [email or web address]   :544282}   Hyperlipidemia Sister     Other Cancer Sister         CLL    Substance Abuse Sister         In recovery    Substance Abuse Father              Substance Abuse Brother         In recovery    Osteoporosis Brother     Substance Abuse Daughter         In & out of recovery    Substance Abuse Son         In recovery    Other Cancer Son         One kidney removed 2 yrs ago at [place] . Existing kidney functions good         Social History:     Social History     Socioeconomic History    Marital status:      Spouse name: Not on file    Number of children: Not on file    Years of education: Not on file    Highest education level: Not on file   Occupational History    Not on file   Tobacco Use    Smoking status: Former     Current packs/day: 0.00     Average packs/day: 0.5 packs/day for 2.4 years (1.2 ttl pk-yrs)     Types: Cigarettes     Start date: 1961     Quit date: 1963     Years since quittin.6    Smokeless tobacco: Never   Vaping Use    Vaping status: Never Used   Substance and Sexual Activity    Alcohol use: Yes     Comment: 2 glasses of most days    Drug use: Never    Sexual activity: Not Currently     Partners: Male     Birth control/protection: None   Other Topics Concern    Parent/sibling w/ CABG, MI or angioplasty before 65F 55M? No   Social History Narrative    Not on file     Social Drivers of Health     Financial Resource Strain: Low Risk  (2024)    Financial Resource Strain     Within the past 12 months, have you or your family members you live with been unable to get utilities (heat, electricity) when it was really needed?: No   Food Insecurity: Low Risk  (2024)    Food Insecurity     Within the past 12 months, did you worry that your food would run out before you got money to buy more?: No     Within the past 12 months, did the food you bought just not last and you didn t have money to get more?: No   Transportation Needs: Low Risk  (2024)    Transportation Needs      Within the past 12 months, has lack of transportation kept you from medical appointments, getting your medicines, non-medical meetings or appointments, work, or from getting things that you need?: No   Physical Activity: Sufficiently Active (11/8/2024)    Exercise Vital Sign     Days of Exercise per Week: 6 days     Minutes of Exercise per Session: 30 min   Stress: No Stress Concern Present (11/8/2024)    [employer] [place] [employer] - Occupational Stress Questionnaire     Feeling of Stress : Only a little   Social Connections: Unknown (11/8/2024)    Social Connection and Isolation Panel [NHANES]     Frequency of Communication with Friends and Family: Not on file     Frequency of Social Gatherings with Friends and Family: Twice a week     Attends Denominational Services: Not on file     Active Member of Clubs or Organizations: Not on file     Attends Club or Organization Meetings: Not on file     Marital Status: Not on file   Interpersonal Safety: Low Risk  (12/4/2024)    Interpersonal Safety     Do you feel physically and emotionally safe where you currently live?: Yes     Within the past 12 months, have you been hit, slapped, kicked or otherwise physically hurt by someone?: No     Within the past 12 months, have you been humiliated or emotionally abused in other ways by your partner or ex-partner?: No   Housing Stability: Low Risk  (11/8/2024)    Housing Stability     Do you have housing? : Yes     Are you worried about losing your housing?: No           Review of System:     Constitutional: Negative for fever or chills  Skin: Negative for rashes  Ears/Nose/Throat: Negative for nasal congestion, sore throat  Respiratory: No shortness of breath, dyspnea on exertion, cough, or hemoptysis  Cardiovascular: Negative for chest pain  Gastrointestinal: Negative for nausea, vomiting  Genitourinary: Negative for dysuria, hematuria  Musculoskeletal: Negative for myalgias  Neurologic: Negative for  "headaches  Psychiatric: Negative for depression, anxiety  Hematologic/Lymphatic/Immunologic: Negative  Endocrine: positive for hypothyroidism  Behavioral: Negative for tobacco use       Physical Exam:   BP (!) 155/90   Pulse 54   Temp 97.7  F (36.5  C) (Temporal)   Resp 16   Ht 1.626 m (5' 4\")   Wt 72.1 kg (159 lb)   LMP  (LMP Unknown)   SpO2 98%   BMI 27.29 kg/m      GENERAL: alert and no distress  EYES: eyes grossly normal to inspection, and conjunctivae and sclerae normal  HENT: Normocephalic atraumatic. Nose and mouth without ulcers or lesions  NECK: supple  RESP: lungs clear to auscultation   CV: regular rate and rhythm, normal S1 S2  LYMPH: no peripheral edema   ABDOMEN: nondistended  MS: no gross musculoskeletal defects noted  SKIN: no suspicious lesions or rashes  NEURO: Alert & Oriented x 3.   PSYCH: mentation appears normal, affect normal        Diagnostic Test Results:     Diagnostic Test Results:  Labs reviewed in Epic    ASSESSMENT/PLAN:       Marah was seen today for follow up.    Diagnoses and all orders for this visit:    Sinus bradycardia  -     Adult Cardiology Eval [facility] Referral; Future    Benign essential hypertension  - stable, continue current therapy    Hyperlipidemia LDL goal <100  - stable, continue current therapy    Hypothyroidism, unspecified type  - stable, continue current therapy      Follow Up Plan:     Patient is instructed to return to Internal Medicine clinic for follow-up visit in 1 month.        Arelis High MD  Internal Medicine  [facility]      The longitudinal plan of care for the diagnosis(es)/condition(s) as documented were addressed during this visit. Due to the added complexity in care, I will continue to support this patient in the subsequent management and with ongoing continuity of care.     Signed Electronically by: Arelis High MD    "

## 2024-12-05 DIAGNOSIS — E78.5 HYPERLIPIDEMIA LDL GOAL <100: ICD-10-CM

## 2024-12-05 RX ORDER — ATORVASTATIN CALCIUM 10 MG/1
10 TABLET, FILM COATED ORAL DAILY
Qty: 90 TABLET | Refills: 3 | Status: SHIPPED | OUTPATIENT
Start: 2024-12-05

## 2024-12-10 ENCOUNTER — OFFICE VISIT (OUTPATIENT)
Dept: CARDIOLOGY | Facility: CLINIC | Age: 84
End: 2024-12-10
Attending: INTERNAL MEDICINE
Payer: MEDICARE

## 2024-12-10 VITALS
DIASTOLIC BLOOD PRESSURE: 82 MMHG | RESPIRATION RATE: 18 BRPM | BODY MASS INDEX: 27.29 KG/M2 | HEART RATE: 60 BPM | HEIGHT: 64 IN | SYSTOLIC BLOOD PRESSURE: 160 MMHG | OXYGEN SATURATION: 99 %

## 2024-12-10 DIAGNOSIS — R00.1 SINUS BRADYCARDIA: ICD-10-CM

## 2024-12-10 NOTE — LETTER
"12/10/2024    Arelis High MD  [address]  [facility] [place] [address]    RE: Marah Mccormick       Dear Colleague,     I had the pleasure of seeing Marah Mccormick in the [facility].  Cardiology Consultation      Marah Mccormick MRN# [id number]   YOB: 1940 Age: 84 year old   Date of Visit 12/10/2024     Reason for consult: Asymptomatic sinus bradycardia           Assessment and Plan:     Asymptomatic sinus bradycardia without presyncope or syncope.  No exertional intolerance.  Recommend conservative management.  Offered her the options of ZIO versus stress testing versus expectant management and Apple or android watch with heart rate tracking  If symptoms correlated with bradycardia in the future, could reassess.  Follow-up as needed      Hypertension, suboptimal control  Patient will check her blood pressure twice per day and discussed with her primary clinician to increase her valsartan and if still suboptimally controlled.      45 minutes spent today in review of past medical record, discussion with patient and postvisit charting    This note was transcribed using electronic voice recognition software, typographical errors may be present.                  Chief Complaint:   New Patient and Bradycardia           History of Present Illness:   This patient is an 84-year-old female accompanied by her son.  She has no history of cardiovascular disease.  She has hypertension on a small dose of valsartan that she takes in the evenings.    She has had 2 elevated blood pressures in the past week.    She is sent for asymptomatic sinus bradycardia.  I confirmed that she denies any dyspnea on exertion.  No presyncope or syncope.  No lightheadedness.  No fatigue.  Good exercise tolerance.  No documented significant bradycardia.  Occasional PACs/PVCs on auscultation.  No significant murmurs.           Physical Exam:     Vitals: BP (!) 160/82   Pulse 60   Resp 18   Ht 1.626 m (5' 4\")   LMP  " (LMP Unknown)   SpO2 99%   BMI 27.29 kg/m    Constitutional:  cooperative, alert and oriented, well developed, well nourished, in no acute distress        Skin:  warm and dry to the touch, no apparent skin lesions or masses noted        Head:  normocephalic, no masses or lesions        Eyes:  pupils equal and round, conjunctivae and lids unremarkable, sclera white, no xanthalasma, EOMS intact, no nystagmus        ENT:  no pallor or cyanosis        Neck:  JVP normal        Chest:  normal symmetry        Cardiac: regular rhythm, normal S1 and S2 occasional premature beats                Abdomen:  BS normoactive        Extremities and Back:  no deformities, clubbing, cyanosis, erythema observed        Neurological:  no gross motor deficits, affect appropriate                      Past Medical History:   I have reviewed this patient's past medical history  Past Medical History:   Diagnosis Date     Cancer (H) 2019    Had melanoma & had surgery  on 2119     Hyperlipidemia LDL goal <100      Hypertension 2015    Taking Valsartan 80 Mg 1 daily     Thyroid disease     Taking Levothyroxine 88 mg 1 day in morning             Past Surgical History:   I have reviewed this patient's past surgical history  Past Surgical History:   Procedure Laterality Date     BIOPSY  2019    Melanoma     COLONOSCOPY  2019     DAVINCI HERNIORRHAPHY VENTRAL Left 2023    Procedure: robot assisted ventral hernia repair with mesh;  Surgeon: Gokul Lujan MD;  Location:  OR     VASCULAR SURGERY  2013    Varicose vein surgery               Social History:   I have reviewed this patient's social history  Social History     Tobacco Use     Smoking status: Former     Current packs/day: 0.00     Average packs/day: 0.5 packs/day for 2.4 years (1.2 ttl pk-yrs)     Types: Cigarettes     Start date: 1961     Quit date: 1963     Years since quittin.6     Smokeless tobacco: Never   Substance Use  Topics     Alcohol use: Yes     Comment: 2 glasses of most days             Family History:   I have reviewed this patient's family history  Family History   Problem Relation Age of Onset     Hypertension Brother      Hypertension Sister      Hyperlipidemia Sister      Other Cancer Sister         CLL     Substance Abuse Sister         In recovery     Substance Abuse Father               Substance Abuse Brother         In recovery     Osteoporosis Brother      Substance Abuse Daughter         In & out of recovery     Substance Abuse Son         In recovery     Other Cancer Son         One kidney removed 2 yrs ago at [place] . Existing kidney functions good             Allergies:     Allergies   Allergen Reactions     No Known Allergies              Medications:   I have reviewed this patient's current medications  Current Outpatient Medications   Medication Sig Dispense Refill     atorvastatin (LIPITOR) 10 MG tablet TAKE 1 TABLET EVERY DAY 90 tablet 3     ketoconazole (NIZORAL) 2 % external shampoo Apply topically daily as needed for irritation or itching       latanoprost (XALATAN) 0.005 % ophthalmic solution instill 1 drop into each eye at bedtime       levothyroxine (SYNTHROID/LEVOTHROID) 88 MCG tablet Take 1 tablet (88 mcg) by mouth daily. 90 tablet 10     neomycin-polymyxin-dexamethasone (MAXITROL) 3.5-10947-3.1 ophthalmic ointment Place 0.5 inches into both eyes as needed       valsartan (DIOVAN) 80 MG tablet TAKE 1 TABLET EVERY DAY 90 tablet 1               Review of Systems:       Review of Systems:  Skin:  Negative     Eyes:  Negative    ENT:  Negative    Respiratory:  Negative    Cardiovascular:  Negative    Gastroenterology: Negative    Genitourinary:  Negative    Musculoskeletal:  Negative    Neurologic:  Negative    Psychiatric:  Negative    Heme/Lymph/Imm:  Negative    Endocrine:  Positive for thyroid disorder                     Data:   All available laboratory data reviewed  Lab Results  "  Component Value Date    CHOL 144 11/13/2024     Lab Results   Component Value Date    HDL 56 11/13/2024     Lab Results   Component Value Date    LDL 77 11/13/2024     Lab Results   Component Value Date    TRIG 53 11/13/2024    TRIG 47 09/08/2020     No results found for: \"CHOLHDLRATIO\"  TSH   Date Value Ref Range Status   11/13/2024 3.55 0.30 - 4.20 uIU/mL Final   10/13/2022 0.91 0.40 - 4.00 mU/L Final     Last Basic Metabolic Panel:  Lab Results   Component Value Date     10/13/2022      Lab Results   Component Value Date    POTASSIUM 4.8 10/18/2022     Lab Results   Component Value Date    CHLORIDE 108 10/13/2022     Lab Results   Component Value Date    SANJU 9.4 10/13/2022     Lab Results   Component Value Date    CO2 28 10/13/2022     Lab Results   Component Value Date    BUN 12 10/13/2022     Lab Results   Component Value Date    CR 0.8 04/11/2024    CR 0.77 10/13/2022     Lab Results   Component Value Date    GLC 80 10/13/2022     Lab Results   Component Value Date    WBC 6.3 10/13/2022     Lab Results   Component Value Date    RBC 5.03 10/13/2022     Lab Results   Component Value Date    HGB 15.5 09/13/2023     Lab Results   Component Value Date    HCT 46.0 10/13/2022     Lab Results   Component Value Date    MCV 92 10/13/2022     Lab Results   Component Value Date    MCH 30.2 10/13/2022     Lab Results   Component Value Date    MCHC 33.0 10/13/2022     Lab Results   Component Value Date    RDW 12.4 10/13/2022     Lab Results   Component Value Date     09/13/2023       Thank you for allowing me to participate in the care of your patient.      Sincerely,     Harjinder Green MD     [facility]  cc:   Arelis High MD  [address]  [place] [address]      "

## 2024-12-10 NOTE — PROGRESS NOTES
"Cardiology Consultation      Marah Mccormick MRN# [id number]   YOB: 1940 Age: 84 year old   Date of Visit 12/10/2024     Reason for consult: Asymptomatic sinus bradycardia           Assessment and Plan:     Asymptomatic sinus bradycardia without presyncope or syncope.  No exertional intolerance.  Recommend conservative management.  Offered her the options of ZIO versus stress testing versus expectant management and Apple or android watch with heart rate tracking  If symptoms correlated with bradycardia in the future, could reassess.  Follow-up as needed      Hypertension, suboptimal control  Patient will check her blood pressure twice per day and discussed with her primary clinician to increase her valsartan and if still suboptimally controlled.      45 minutes spent today in review of past medical record, discussion with patient and postvisit charting    This note was transcribed using electronic voice recognition software, typographical errors may be present.                  Chief Complaint:   New Patient and Bradycardia           History of Present Illness:   This patient is an 84-year-old female accompanied by her son.  She has no history of cardiovascular disease.  She has hypertension on a small dose of valsartan that she takes in the evenings.    She has had 2 elevated blood pressures in the past week.    She is sent for asymptomatic sinus bradycardia.  I confirmed that she denies any dyspnea on exertion.  No presyncope or syncope.  No lightheadedness.  No fatigue.  Good exercise tolerance.  No documented significant bradycardia.  Occasional PACs/PVCs on auscultation.  No significant murmurs.           Physical Exam:     Vitals: BP (!) 160/82   Pulse 60   Resp 18   Ht 1.626 m (5' 4\")   LMP  (LMP Unknown)   SpO2 99%   BMI 27.29 kg/m    Constitutional:  cooperative, alert and oriented, well developed, well nourished, in no acute distress        Skin:  warm and dry to the touch, no apparent " skin lesions or masses noted        Head:  normocephalic, no masses or lesions        Eyes:  pupils equal and round, conjunctivae and lids unremarkable, sclera white, no xanthalasma, EOMS intact, no nystagmus        ENT:  no pallor or cyanosis        Neck:  JVP normal        Chest:  normal symmetry        Cardiac: regular rhythm, normal S1 and S2 occasional premature beats                Abdomen:  BS normoactive        Extremities and Back:  no deformities, clubbing, cyanosis, erythema observed        Neurological:  no gross motor deficits, affect appropriate                      Past Medical History:   I have reviewed this patient's past medical history  Past Medical History:   Diagnosis Date    Cancer (H) 2019    Had melanoma & had surgery  on 2119    Hyperlipidemia LDL goal <100     Hypertension 2015    Taking Valsartan 80 Mg 1 daily    Thyroid disease     Taking Levothyroxine 88 mg 1 day in morning             Past Surgical History:   I have reviewed this patient's past surgical history  Past Surgical History:   Procedure Laterality Date    BIOPSY      Melanoma    COLONOSCOPY      DAVINCI HERNIORRHAPHY VENTRAL Left 2023    Procedure: robot assisted ventral hernia repair with mesh;  Surgeon: Gokul Lujan MD;  Location:  OR    VASCULAR SURGERY  2013    Varicose vein surgery               Social History:   I have reviewed this patient's social history  Social History     Tobacco Use    Smoking status: Former     Current packs/day: 0.00     Average packs/day: 0.5 packs/day for 2.4 years (1.2 ttl pk-yrs)     Types: Cigarettes     Start date: 1961     Quit date: 1963     Years since quittin.6    Smokeless tobacco: Never   Substance Use Topics    Alcohol use: Yes     Comment: 2 glasses of most days             Family History:   I have reviewed this patient's family history  Family History   Problem Relation Age of Onset    Hypertension Brother      Hypertension Sister     Hyperlipidemia Sister     Other Cancer Sister         CLL    Substance Abuse Sister         In recovery    Substance Abuse Father              Substance Abuse Brother         In recovery    Osteoporosis Brother     Substance Abuse Daughter         In & out of recovery    Substance Abuse Son         In recovery    Other Cancer Son         One kidney removed 2 yrs ago at [place] . Existing kidney functions good             Allergies:     Allergies   Allergen Reactions    No Known Allergies              Medications:   I have reviewed this patient's current medications  Current Outpatient Medications   Medication Sig Dispense Refill    atorvastatin (LIPITOR) 10 MG tablet TAKE 1 TABLET EVERY DAY 90 tablet 3    ketoconazole (NIZORAL) 2 % external shampoo Apply topically daily as needed for irritation or itching      latanoprost (XALATAN) 0.005 % ophthalmic solution instill 1 drop into each eye at bedtime      levothyroxine (SYNTHROID/LEVOTHROID) 88 MCG tablet Take 1 tablet (88 mcg) by mouth daily. 90 tablet 10    neomycin-polymyxin-dexamethasone (MAXITROL) 3.5-40389-7.1 ophthalmic ointment Place 0.5 inches into both eyes as needed      valsartan (DIOVAN) 80 MG tablet TAKE 1 TABLET EVERY DAY 90 tablet 1               Review of Systems:       Review of Systems:  Skin:  Negative     Eyes:  Negative    ENT:  Negative    Respiratory:  Negative    Cardiovascular:  Negative    Gastroenterology: Negative    Genitourinary:  Negative    Musculoskeletal:  Negative    Neurologic:  Negative    Psychiatric:  Negative    Heme/Lymph/Imm:  Negative    Endocrine:  Positive for thyroid disorder                     Data:   All available laboratory data reviewed  Lab Results   Component Value Date    CHOL 144 2024     Lab Results   Component Value Date    HDL 56 2024     Lab Results   Component Value Date    LDL 77 2024     Lab Results   Component Value Date    TRIG 53 2024    TRIG 47  "09/08/2020     No results found for: \"CHOLHDLRATIO\"  TSH   Date Value Ref Range Status   11/13/2024 3.55 0.30 - 4.20 uIU/mL Final   10/13/2022 0.91 0.40 - 4.00 mU/L Final     Last Basic Metabolic Panel:  Lab Results   Component Value Date     10/13/2022      Lab Results   Component Value Date    POTASSIUM 4.8 10/18/2022     Lab Results   Component Value Date    CHLORIDE 108 10/13/2022     Lab Results   Component Value Date    SANJU 9.4 10/13/2022     Lab Results   Component Value Date    CO2 28 10/13/2022     Lab Results   Component Value Date    BUN 12 10/13/2022     Lab Results   Component Value Date    CR 0.8 04/11/2024    CR 0.77 10/13/2022     Lab Results   Component Value Date    GLC 80 10/13/2022     Lab Results   Component Value Date    WBC 6.3 10/13/2022     Lab Results   Component Value Date    RBC 5.03 10/13/2022     Lab Results   Component Value Date    HGB 15.5 09/13/2023     Lab Results   Component Value Date    HCT 46.0 10/13/2022     Lab Results   Component Value Date    MCV 92 10/13/2022     Lab Results   Component Value Date    MCH 30.2 10/13/2022     Lab Results   Component Value Date    MCHC 33.0 10/13/2022     Lab Results   Component Value Date    RDW 12.4 10/13/2022     Lab Results   Component Value Date     09/13/2023       "

## 2024-12-26 ENCOUNTER — NURSE TRIAGE (OUTPATIENT)
Dept: FAMILY MEDICINE | Facility: CLINIC | Age: 84
End: 2024-12-26
Payer: MEDICARE

## 2024-12-26 DIAGNOSIS — U07.1 INFECTION DUE TO 2019 NOVEL CORONAVIRUS: Primary | ICD-10-CM

## 2024-12-26 NOTE — TELEPHONE ENCOUNTER
RN COVID TREATMENT VISIT  12/26/24      The patient has been triaged and does not require a higher level of care.    Marah Mccormick  84 year old  Current weight? 160 lb    Has the patient been seen by a primary care or specialty provider at a [facility] within the past three years? Yes.   Have you been in close proximity to/do you have a known exposure to a person with a confirmed case of influenza? No.     General treatment eligibility:  Date of positive COVID test (PCR or at home)?  Today 12-26-24    Are you or have you been hospitalized for this COVID-19 infection? No.   Have you received monoclonal antibodies or antiviral treatment for COVID-19 since this positive test? No.   Do you have any of the following conditions that place you at risk of being very sick from COVID-19?   - Age 50 or older  Yes, patient has at least one high risk condition as noted above.     Current COVID symptoms:   - congestion or runny nose  Yes. Patient has at least one symptom as selected.     How many days since symptoms started? 5 days or less. Established patient, 12 years or older weighing at least 88.2 lbs, who has symptoms that started in the past 5 days, has not been hospitalized nor received treatment already, and is at risk for being very sick from COVID-19.     Treatment eligibility by RN:  Are you currently pregnant or nursing? No  Do you have a clinically significant hypersensitivity to nirmatrelvir or ritonavir, or toxic epidermal necrolysis (TEN) or Abdi-Duc Syndrome? No  Do you have a history of hepatitis, any hepatic impairment on the Problem List (such as Child-Nieves Class C, cirrhosis, fatty liver disease, alcoholic liver disease), or was the last liver lab (hepatic panel, ALT, AST, ALK Phos, bilirubin) elevated in the past 6 months? No  Do you have any history of severe renal impairment (eGFR < 30mL/min)? No    Is patient eligible to continue? Yes, patient meets all eligibility  requirements for the RN COVID treatment (as denoted by all no responses above).     Current Outpatient Medications   Medication Sig Dispense Refill    atorvastatin (LIPITOR) 10 MG tablet TAKE 1 TABLET EVERY DAY 90 tablet 3    ketoconazole (NIZORAL) 2 % external shampoo Apply topically daily as needed for irritation or itching      latanoprost (XALATAN) 0.005 % ophthalmic solution instill 1 drop into each eye at bedtime      levothyroxine (SYNTHROID/LEVOTHROID) 88 MCG tablet Take 1 tablet (88 mcg) by mouth daily. 90 tablet 10    neomycin-polymyxin-dexamethasone (MAXITROL) 3.5-90870-2.1 ophthalmic ointment Place 0.5 inches into both eyes as needed      valsartan (DIOVAN) 80 MG tablet TAKE 1 TABLET EVERY DAY 90 tablet 1       Medications from List 1 of the standing order (on medications that exclude the use of Paxlovid) that patient is taking: NONE.   Is patient taking any meds from List 1? No.   Medications from List 2 of the standing order (on meds that provider needs to adjust) that patient is taking: NONE. Is patient on any of the meds from List 2? No.   Medications from List 3 of standing order (on meds that a RN needs to adjust) that patient is taking: atorvastatin (Lipitor): Instructed patient to stop atorvastatin while taking Paxlovid and restart atorvastatin 3 days after the completion of Paxlovid.  Is patient on any meds from List 3? Yes. Patient is on meds from list 3. No meds require a provider visit and at least one med required RN to adjust.     Paxlovid has an approximate 90% reduction in hospitalization. Paxlovid can possibly cause altered sense of taste, diarrhea (loose, watery stools), high blood pressure, muscle aches.     Would patient like a Paxlovid prescription?   Yes.   Lab Results   Component Value Date    GFRESTIMATED >60 04/11/2024       Was last eGFR reduced? No, eGFR 60 or greater/ No Result on record. Patient can receive the normal renal function dose. Paxlovid Rx sent to [facility]  "  [facility]   [phone]  [address] [place] [address]    Hours:  Mon-Fri: 8:30a - 6:00p  Sat-Sun: 8:30a - 12:30p    Curbside Delivery: Patient to call [phone] to set up and  at door 2 of [facility]    Temporary change to home medications: see above    All medication adjustments (holds, etc) were discussed with the patient and patient was asked to repeat back (teachback) their med adjustment.  Did patient understand med adjustment? Yes, patient repeated back and understood correctly.        Reviewed the following instructions with the patient:    Paxlovid (nimatrelvir and ritonavir)    How it works  Two medicines (nirmatrelvir and ritonavir) are taken together. They stop the virus from growing. Less amount of virus is easier for your body to fight.    How to take  Medicine comes in a daily container with both medicine tablets. Take by mouth twice daily (once in the morning, once at night) for 5 days.  The number of tablets to take varies by patient.  Don't chew or break capsules. Swallow whole.    When to take  Take as soon as possible after positive COVID-19 test result, and within 5 days of your first symptoms.    Possible side effects  Can cause altered sense of taste, diarrhea (loose, watery stools), high blood pressure, muscle aches.    Jamilah Mendez, RN           Answer Assessment - Initial Assessment Questions  1. SYMPTOMS: \"What is your main symptom or concern?\" (e.g., cough, fever, shortness of breath, muscle aches)      Drippy nose, stuffy nose  2. ONSET: \"When did the symptoms start?\"       12/24  3. COUGH: \"Do you have a cough?\" If Yes, ask: \"How bad is the cough?\"        none  4. FEVER: \"Do you have a fever?\" If Yes, ask: \"What is your temperature, how was it measured, and when did it start?\"      none  5. BREATHING DIFFICULTY: \"Are you having any difficulty breathing?\" (e.g., normal; shortness of breath, wheezing, unable to speak)       none  6. " "BETTER-SAME-WORSE: \"Are you getting better, staying the same or getting worse compared to yesterday?\"  If getting worse, ask, \"In what way?\"      better  7. OTHER SYMPTOMS: \"Do you have any other symptoms?\"  (e.g., chills, fatigue, headache, loss of smell or taste, muscle pain, sore throat)      none  8. COVID-19 DIAGNOSIS: \"How do you know that you have COVID?\" (e.g., positive lab test or self-test, diagnosed by doctor or NP/PA, symptoms after exposure).      Home test  9. COVID-19 EXPOSURE: \"Was there any known exposure to COVID before the symptoms began?\"       none  10. COVID-19 VACCINE: \"Have you had the COVID-19 vaccine?\" If Yes, ask: \"When did you last get it?\"        Yes. Last dose October 11. HIGH RISK DISEASE: \"Do you have any chronic medical problems?\" (e.g., asthma, heart or lung disease, weak immune system, obesity, etc.)        HTN  12. PREGNANCY: \"Is there any chance you are pregnant?\" \"When was your last menstrual period?\"        NA  13. O2 SATURATION MONITOR:  \"Do you use an oxygen saturation monitor (pulse oximeter) at home?\" If Yes, ask \"What is your reading (oxygen level) today?\" \"What is your usual oxygen saturation reading?\" (e.g., 95%)        97%    Protocols used: COVID-19 - Diagnosed or [id number]    "

## 2024-12-27 ENCOUNTER — VIRTUAL VISIT (OUTPATIENT)
Dept: FAMILY MEDICINE | Facility: CLINIC | Age: 84
End: 2024-12-27
Payer: MEDICARE

## 2024-12-27 DIAGNOSIS — U07.1 INFECTION DUE TO 2019 NOVEL CORONAVIRUS: ICD-10-CM

## 2024-12-27 DIAGNOSIS — R05.1 ACUTE COUGH: Primary | ICD-10-CM

## 2024-12-27 PROCEDURE — 99442 PR PHYSICIAN TELEPHONE EVALUATION 11-20 MIN: CPT | Mod: 93 | Performed by: INTERNAL MEDICINE

## 2024-12-27 RX ORDER — CODEINE PHOSPHATE AND GUAIFENESIN 10; 100 MG/5ML; MG/5ML
1-2 SOLUTION ORAL 2 TIMES DAILY PRN
Qty: 118 ML | Refills: 1 | Status: SHIPPED | OUTPATIENT
Start: 2024-12-27

## 2024-12-28 NOTE — PROGRESS NOTES
Marah is a 84 year old who is being evaluated via a billable telephone visit.      What phone number would you like to be contacted at? [phone]  How would you like to obtain your AVS? Chuy    Originating Location (pt. Location): Home  Distant Location (provider location):  Off-site      Subjective   Marah is a 84 year old presenting for the following health issues:  Covid treatment       History of Present Illness       Reason for visit:  Follow up on recent Covid infection    She eats 2-3 servings of fruits and vegetables daily.She consumes 1 sweetened beverage(s) daily.She exercises with enough effort to increase her heart rate 20 to 29 minutes per day.  She exercises with enough effort to increase her heart rate 5 days per week.   She is taking medications regularly.         Current Medications:     Current Outpatient Medications   Medication Sig Dispense Refill    guaiFENesin-codeine (ROBITUSSIN AC) 100-10 MG/5ML solution Take 5-10 mLs by mouth 2 times daily as needed for cough. 118 mL 1    atorvastatin (LIPITOR) 10 MG tablet TAKE 1 TABLET EVERY DAY 90 tablet 3    ketoconazole (NIZORAL) 2 % external shampoo Apply topically daily as needed for irritation or itching      latanoprost (XALATAN) 0.005 % ophthalmic solution instill 1 drop into each eye at bedtime      levothyroxine (SYNTHROID/LEVOTHROID) 88 MCG tablet Take 1 tablet (88 mcg) by mouth daily. 90 tablet 10    neomycin-polymyxin-dexamethasone (MAXITROL) 3.5-55729-5.1 ophthalmic ointment Place 0.5 inches into both eyes as needed      nirmatrelvir and ritonavir (PAXLOVID) 300 mg/100 mg therapy pack Take 3 tablets by mouth 2 times daily for 5 days. 30 tablet 0    valsartan (DIOVAN) 80 MG tablet TAKE 1 TABLET EVERY DAY 90 tablet 1         Allergies:      Allergies   Allergen Reactions    No Known Allergies             Past Medical History:     Past Medical History:   Diagnosis Date    Cancer (H) 12/2019    Had melanoma & had surgery  on December 18      Hyperlipidemia LDL goal <100     Hypertension 2015    Taking Valsartan 80 Mg 1 daily    Thyroid disease     Taking Levothyroxine 88 mg 1 day in morning         Past Surgical History:     Past Surgical History:   Procedure Laterality Date    BIOPSY  2019    Melanoma    COLONOSCOPY  2019    DAVINCI HERNIORRHAPHY VENTRAL Left 2023    Procedure: robot assisted ventral hernia repair with mesh;  Surgeon: Gokul Lujan MD;  Location: [facility] OR    VASCULAR SURGERY  2013    Varicose vein surgery         Family Medical History:     Family History   Problem Relation Age of Onset    Hypertension Brother     Hypertension Sister     Hyperlipidemia Sister     Other Cancer Sister         CLL    Substance Abuse Sister         In recovery    Substance Abuse Father          1970    Substance Abuse Brother         In recovery    Osteoporosis Brother     Substance Abuse Daughter         In & out of recovery    Substance Abuse Son         In recovery    Other Cancer Son         One kidney removed 2 yrs ago at [place] . Existing kidney functions good         Social History:     Social History     Socioeconomic History    Marital status:      Spouse name: Not on file    Number of children: Not on file    Years of education: Not on file    Highest education level: Not on file   Occupational History    Not on file   Tobacco Use    Smoking status: Former     Current packs/day: 0.00     Average packs/day: 0.5 packs/day for 2.4 years (1.2 ttl pk-yrs)     Types: Cigarettes     Start date: 1961     Quit date: 1963     Years since quittin.6    Smokeless tobacco: Never   Vaping Use    Vaping status: Never Used   Substance and Sexual Activity    Alcohol use: Yes     Comment: 2 glasses of most days    Drug use: Never    Sexual activity: Not Currently     Partners: Male     Birth control/protection: None   Other Topics Concern    Parent/sibling w/ CABG, MI or angioplasty before 65F 55M? No    Social History Narrative    Not on file     Social Drivers of Health     Financial Resource Strain: Low Risk  (11/8/2024)    Financial Resource Strain     Within the past 12 months, have you or your family members you live with been unable to get utilities (heat, electricity) when it was really needed?: No   Food Insecurity: Low Risk  (11/8/2024)    Food Insecurity     Within the past 12 months, did you worry that your food would run out before you got money to buy more?: No     Within the past 12 months, did the food you bought just not last and you didn t have money to get more?: No   Transportation Needs: Low Risk  (11/8/2024)    Transportation Needs     Within the past 12 months, has lack of transportation kept you from medical appointments, getting your medicines, non-medical meetings or appointments, work, or from getting things that you need?: No   Physical Activity: Sufficiently Active (11/8/2024)    Exercise Vital Sign     Days of Exercise per Week: 6 days     Minutes of Exercise per Session: 30 min   Stress: No Stress Concern Present (11/8/2024)    [employer] - Occupational Stress Questionnaire     Feeling of Stress : Only a little   Social Connections: Unknown (11/8/2024)    Social Connection and Isolation Panel [NHANES]     Frequency of Communication with Friends and Family: Not on file     Frequency of Social Gatherings with Friends and Family: Twice a week     Attends [employer] Services: Not on file     Active Member of Clubs or Organizations: Not on file     Attends Club or Organization Meetings: Not on file     Marital Status: Not on file   Interpersonal Safety: Low Risk  (12/4/2024)    Interpersonal Safety     Do you feel physically and emotionally safe where you currently live?: Yes     Within the past 12 months, have you been hit, slapped, kicked or otherwise physically hurt by someone?: No     Within the past 12 months, have you been humiliated or emotionally  abused in other ways by your partner or ex-partner?: No   Housing Stability: Low Risk  (11/8/2024)    Housing Stability     Do you have housing? : Yes     Are you worried about losing your housing?: No           Review of System:     Constitutional: positive for fever or chills  Ears/Nose/Throat: positive for nasal congestion, sore throat  Respiratory: positive for cough, COVID infection  Cardiovascular: Negative for chest pain  Behavioral: Negative for tobacco use       Physical Exam:   LMP  (LMP Unknown)     RESP: cough symptoms present   NEURO: Alert & Oriented x 3.   PSYCH: mentation appears normal, affect normal        Diagnostic Test Results:     Diagnostic Test Results:  Labs reviewed in Epic    ASSESSMENT/PLAN:       Marah was seen today for covid treatment .    Diagnoses and all orders for this visit:    Acute cough  -     guaiFENesin-codeine (ROBITUSSIN AC) 100-10 MG/5ML solution; Take 5-10 mLs by mouth 2 times daily as needed for cough.    Infection due to 2019 novel coronavirus  -     guaiFENesin-codeine (ROBITUSSIN AC) 100-10 MG/5ML solution; Take 5-10 mLs by mouth 2 times daily as needed for cough.          Follow Up Plan:     Patient is instructed to return to Internal Medicine clinic for follow-up visit in 1 month.        Arelis High MD  Internal Medicine  [facility]      telephone visit duration including chart review, medication management: 12 minutes

## 2025-01-13 DIAGNOSIS — E03.9 HYPOTHYROIDISM, UNSPECIFIED TYPE: ICD-10-CM

## 2025-01-14 RX ORDER — LEVOTHYROXINE SODIUM 88 UG/1
88 TABLET ORAL DAILY
Qty: 90 TABLET | Refills: 2 | Status: SHIPPED | OUTPATIENT
Start: 2025-01-14

## 2025-03-20 DIAGNOSIS — E78.5 HYPERLIPIDEMIA LDL GOAL <100: ICD-10-CM

## 2025-03-21 ENCOUNTER — TELEPHONE (OUTPATIENT)
Dept: FAMILY MEDICINE | Facility: CLINIC | Age: 85
End: 2025-03-21
Payer: MEDICARE

## 2025-03-21 NOTE — TELEPHONE ENCOUNTER
Patient Returning Call    Reason for call:  Want to know if she need to be fasting for appt     Information relayed to patient:      Patient has additional questions:  Yes    What are your questions/concerns:  above    Who does the patient want to speak with:      Is an  needed?:  No      Could we send this information to you in [facility] or would you prefer to receive a phone call?:   Patient would prefer a phone call   Okay to leave a detailed message?: Yes at Home number on file [phone] (home)

## 2025-03-24 ENCOUNTER — OFFICE VISIT (OUTPATIENT)
Dept: FAMILY MEDICINE | Facility: CLINIC | Age: 85
End: 2025-03-24
Payer: MEDICARE

## 2025-03-24 VITALS
HEIGHT: 64 IN | WEIGHT: 158 LBS | BODY MASS INDEX: 26.98 KG/M2 | OXYGEN SATURATION: 98 % | DIASTOLIC BLOOD PRESSURE: 89 MMHG | HEART RATE: 52 BPM | RESPIRATION RATE: 12 BRPM | TEMPERATURE: 98 F | SYSTOLIC BLOOD PRESSURE: 149 MMHG

## 2025-03-24 DIAGNOSIS — I10 BENIGN ESSENTIAL HYPERTENSION: Primary | ICD-10-CM

## 2025-03-24 DIAGNOSIS — E78.5 HYPERLIPIDEMIA LDL GOAL <100: ICD-10-CM

## 2025-03-24 DIAGNOSIS — R00.1 SINUS BRADYCARDIA: ICD-10-CM

## 2025-03-24 DIAGNOSIS — E03.9 HYPOTHYROIDISM, UNSPECIFIED TYPE: ICD-10-CM

## 2025-03-24 PROCEDURE — 3077F SYST BP >= 140 MM HG: CPT | Performed by: INTERNAL MEDICINE

## 2025-03-24 PROCEDURE — G2211 COMPLEX E/M VISIT ADD ON: HCPCS | Performed by: INTERNAL MEDICINE

## 2025-03-24 PROCEDURE — 99214 OFFICE O/P EST MOD 30 MIN: CPT | Performed by: INTERNAL MEDICINE

## 2025-03-24 PROCEDURE — 3079F DIAST BP 80-89 MM HG: CPT | Performed by: INTERNAL MEDICINE

## 2025-03-24 PROCEDURE — 1126F AMNT PAIN NOTED NONE PRSNT: CPT | Performed by: INTERNAL MEDICINE

## 2025-03-24 RX ORDER — AMLODIPINE BESYLATE 2.5 MG/1
2.5 TABLET ORAL DAILY
Qty: 90 TABLET | Refills: 3 | Status: SHIPPED | OUTPATIENT
Start: 2025-03-24

## 2025-03-24 RX ORDER — VALSARTAN 80 MG/1
80 TABLET ORAL DAILY
Qty: 90 TABLET | Refills: 3 | Status: SHIPPED | OUTPATIENT
Start: 2025-03-24

## 2025-03-24 RX ORDER — AMLODIPINE BESYLATE 2.5 MG/1
2.5 TABLET ORAL DAILY
Qty: 90 TABLET | Refills: 3 | Status: SHIPPED | OUTPATIENT
Start: 2025-03-24 | End: 2025-03-24

## 2025-03-24 ASSESSMENT — PAIN SCALES - GENERAL: PAINLEVEL_OUTOF10: NO PAIN (0)

## 2025-03-24 NOTE — PROGRESS NOTES
Assessment & Plan     Benign essential hypertension  - BP is not at goal  - BASIC METABOLIC PANEL  - amLODIPine (NORVASC) 2.5 MG tablet  Dispense: 90 tablet; Refill: 3    Hyperlipidemia LDL goal <100  - diet, exercise  - Lipid panel reflex to direct LDL Fasting    Hypothyroidism, unspecified type  - stable  - REVIEW OF HEALTH MAINTENANCE PROTOCOL ORDERS  - TSH with free T4 reflex    Sinus bradycardia  - stable  - CBC with platelets and differential              FUTURE APPOINTMENTS:       - Follow-up visit in 1 month    Ovidio Crystal is a 84 year old, presenting for the following health issues:  Follow Up    History of Present Illness       Reason for visit:  Test for blood pressure med   She is taking medications regularly.        Current Outpatient Medications   Medication Sig Dispense Refill    amLODIPine (NORVASC) 2.5 MG tablet Take 1 tablet (2.5 mg) by mouth daily. 90 tablet 3    atorvastatin (LIPITOR) 10 MG tablet TAKE 1 TABLET EVERY DAY 90 tablet 3    guaiFENesin-codeine (ROBITUSSIN AC) 100-10 MG/5ML solution Take 5-10 mLs by mouth 2 times daily as needed for cough. 118 mL 1    ketoconazole (NIZORAL) 2 % external shampoo Apply topically daily as needed for irritation or itching      latanoprost (XALATAN) 0.005 % ophthalmic solution instill 1 drop into each eye at bedtime      levothyroxine (SYNTHROID/LEVOTHROID) 88 MCG tablet TAKE 1 TABLET EVERY DAY 90 tablet 2    neomycin-polymyxin-dexamethasone (MAXITROL) 3.5-69005-9.1 ophthalmic ointment Place 0.5 inches into both eyes as needed      valsartan (DIOVAN) 80 MG tablet TAKE 1 TABLET EVERY DAY 90 tablet 3     No current facility-administered medications for this visit.     Past Medical History:   Diagnosis Date    Cancer (H) 12/2019    Had melanoma & had surgery  on December 18 2119    Hyperlipidemia LDL goal <100     Hypertension 01/2015    Taking Valsartan 80 Mg 1 daily    Thyroid disease 2000    Taking Levothyroxine 88 mg 1 day in morning     Social  History     Socioeconomic History    Marital status:      Spouse name: Not on file    Number of children: Not on file    Years of education: Not on file    Highest education level: Not on file   Occupational History    Not on file   Tobacco Use    Smoking status: Former     Current packs/day: 0.00     Average packs/day: 0.5 packs/day for 2.4 years (1.2 ttl pk-yrs)     Types: Cigarettes     Start date: 1961     Quit date: 1963     Years since quittin.9    Smokeless tobacco: Never   Vaping Use    Vaping status: Never Used   Substance and Sexual Activity    Alcohol use: Yes     Comment: 2 glasses of most days    Drug use: Never    Sexual activity: Not Currently     Partners: Male     Birth control/protection: None   Other Topics Concern    Parent/sibling w/ CABG, MI or angioplasty before 65F 55M? No   Social History Narrative    Not on file     Social Drivers of Health     Financial Resource Strain: Low Risk  (2024)    Financial Resource Strain     Within the past 12 months, have you or your family members you live with been unable to get utilities (heat, electricity) when it was really needed?: No   Food Insecurity: Low Risk  (2024)    Food Insecurity     Within the past 12 months, did you worry that your food would run out before you got money to buy more?: No     Within the past 12 months, did the food you bought just not last and you didn t have money to get more?: No   Transportation Needs: Low Risk  (2024)    Transportation Needs     Within the past 12 months, has lack of transportation kept you from medical appointments, getting your medicines, non-medical meetings or appointments, work, or from getting things that you need?: No   Physical Activity: Sufficiently Active (2024)    Exercise Vital Sign     Days of Exercise per Week: 6 days     Minutes of Exercise per Session: 30 min   Stress: No Stress Concern Present (2024)    [employer] -  "Occupational Stress Questionnaire     Feeling of Stress : Only a little   Social Connections: Unknown (11/8/2024)    Social Connection and Isolation Panel [NHANES]     Frequency of Communication with Friends and Family: Not on file     Frequency of Social Gatherings with Friends and Family: Twice a week     Attends Mormonism Services: Not on file     Active Member of Clubs or Organizations: Not on file     Attends Club or Organization Meetings: Not on file     Marital Status: Not on file   Interpersonal Safety: Low Risk  (12/4/2024)    Interpersonal Safety     Do you feel physically and emotionally safe where you currently live?: Yes     Within the past 12 months, have you been hit, slapped, kicked or otherwise physically hurt by someone?: No     Within the past 12 months, have you been humiliated or emotionally abused in other ways by your partner or ex-partner?: No   Housing Stability: Low Risk  (11/8/2024)    Housing Stability     Do you have housing? : Yes     Are you worried about losing your housing?: No         Review of Systems  Constitutional, HEENT, cardiovascular, pulmonary, GI, , musculoskeletal, neuro, skin, endocrine and psych systems are negative, except as otherwise noted.      Objective    BP (!) 149/89 (BP Location: Left arm, Patient Position: Sitting, Cuff Size: Adult Regular)   Pulse 52   Temp 98  F (36.7  C) (Tympanic)   Resp 12   Ht 1.626 m (5' 4\")   Wt 71.7 kg (158 lb)   LMP  (LMP Unknown)   SpO2 98%   BMI 27.12 kg/m    Body mass index is 27.12 kg/m .  Physical Exam   GENERAL: alert and no distress  EYES: Eyes grossly normal to inspection, conjunctivae and sclerae normal  HENT: normocephalic atraumatic, nose and mouth without ulcers or lesions  NECK: no asymmetry  RESP: lungs clear to auscultation - no rales, rhonchi or wheezes  CV: regular rate and rhythm, normal S1 S2  ABDOMEN: soft, nontender, bowel sounds normal  MS: no gross musculoskeletal defects noted, no edema  SKIN: no " suspicious lesions or rashes  NEURO: Normal strength and tone, mentation intact and speech normal  PSYCH: mentation appears normal, affect normal/bright    Labs reviewed in Epic      The longitudinal plan of care for the diagnosis(es)/condition(s) as documented were addressed during this visit. Due to the added complexity in care, I will continue to support Marah in the subsequent management and with ongoing continuity of care.          Signed Electronically by: Arelis High MD

## 2025-03-29 ENCOUNTER — HEALTH MAINTENANCE LETTER (OUTPATIENT)
Age: 85
End: 2025-03-29

## 2025-04-17 ENCOUNTER — ANCILLARY PROCEDURE (OUTPATIENT)
Dept: CT IMAGING | Facility: CLINIC | Age: 85
End: 2025-04-17
Attending: INTERNAL MEDICINE
Payer: MEDICARE

## 2025-04-17 DIAGNOSIS — Z85.820 PERSONAL HISTORY OF MALIGNANT MELANOMA OF SKIN: ICD-10-CM

## 2025-04-17 LAB
CREAT BLD-MCNC: 0.9 MG/DL (ref 0.5–1)
EGFRCR SERPLBLD CKD-EPI 2021: >60 ML/MIN/1.73M2

## 2025-04-17 PROCEDURE — 250N000009 HC RX 250: Performed by: INTERNAL MEDICINE

## 2025-04-17 PROCEDURE — 250N000011 HC RX IP 250 OP 636: Performed by: INTERNAL MEDICINE

## 2025-04-17 PROCEDURE — 74177 CT ABD & PELVIS W/CONTRAST: CPT

## 2025-04-17 PROCEDURE — 70491 CT SOFT TISSUE NECK W/DYE: CPT

## 2025-04-17 PROCEDURE — 82565 ASSAY OF CREATININE: CPT

## 2025-04-17 RX ORDER — IOPAMIDOL 755 MG/ML
117 INJECTION, SOLUTION INTRAVASCULAR ONCE
Status: COMPLETED | OUTPATIENT
Start: 2025-04-17 | End: 2025-04-17

## 2025-04-17 RX ADMIN — SODIUM CHLORIDE 40 ML: 9 INJECTION, SOLUTION INTRAVENOUS at 12:08

## 2025-04-17 RX ADMIN — IOPAMIDOL 117 ML: 755 INJECTION, SOLUTION INTRAVENOUS at 12:09

## 2025-04-21 ENCOUNTER — TRANSFERRED RECORDS (OUTPATIENT)
Dept: HEALTH INFORMATION MANAGEMENT | Facility: CLINIC | Age: 85
End: 2025-04-21
Payer: MEDICARE

## 2025-05-01 ENCOUNTER — OFFICE VISIT (OUTPATIENT)
Dept: FAMILY MEDICINE | Facility: CLINIC | Age: 85
End: 2025-05-01
Payer: MEDICARE

## 2025-05-01 VITALS
WEIGHT: 156 LBS | RESPIRATION RATE: 20 BRPM | BODY MASS INDEX: 26.63 KG/M2 | OXYGEN SATURATION: 98 % | HEART RATE: 69 BPM | DIASTOLIC BLOOD PRESSURE: 73 MMHG | SYSTOLIC BLOOD PRESSURE: 128 MMHG | TEMPERATURE: 97.4 F | HEIGHT: 64 IN

## 2025-05-01 DIAGNOSIS — E03.9 HYPOTHYROIDISM, UNSPECIFIED TYPE: ICD-10-CM

## 2025-05-01 DIAGNOSIS — E78.5 HYPERLIPIDEMIA LDL GOAL <100: Primary | ICD-10-CM

## 2025-05-01 DIAGNOSIS — I10 BENIGN ESSENTIAL HYPERTENSION: ICD-10-CM

## 2025-05-01 DIAGNOSIS — E66.3 OVERWEIGHT (BMI 25.0-29.9): ICD-10-CM

## 2025-05-01 PROCEDURE — 1126F AMNT PAIN NOTED NONE PRSNT: CPT | Performed by: INTERNAL MEDICINE

## 2025-05-01 PROCEDURE — 3078F DIAST BP <80 MM HG: CPT | Performed by: INTERNAL MEDICINE

## 2025-05-01 PROCEDURE — 3074F SYST BP LT 130 MM HG: CPT | Performed by: INTERNAL MEDICINE

## 2025-05-01 PROCEDURE — 99214 OFFICE O/P EST MOD 30 MIN: CPT | Performed by: INTERNAL MEDICINE

## 2025-05-01 PROCEDURE — G2211 COMPLEX E/M VISIT ADD ON: HCPCS | Performed by: INTERNAL MEDICINE

## 2025-05-01 ASSESSMENT — PAIN SCALES - GENERAL: PAINLEVEL_OUTOF10: NO PAIN (0)

## 2025-05-01 NOTE — PROGRESS NOTES
"  Assessment & Plan     Hyperlipidemia LDL goal <100  -stable  - Lipitor 10 mg medication refilled    Benign essential hypertension  - stable, BP is at goal  - continue current therapy    Hypothyroidism, unspecified type  - stable  - continue current therapy    Overweight (BMI 25.0-29.9)  - diet, exercise            BMI  Estimated body mass index is 26.78 kg/m  as calculated from the following:    Height as of this encounter: 1.626 m (5' 4\").    Weight as of this encounter: 70.8 kg (156 lb).   Weight management plan: Discussed healthy diet and exercise guidelines      Follow-up       Ovidio Crystal is a 85 year old, presenting for the following health issues:  Hypertension (Patient is here for a follow up visit on new blood pressure  medications.)      5/1/2025    10:39 AM   Additional Questions   Roomed by Ganesh CARDONA MA   Accompanied by Self     History of Present Illness       Hypertension: She presents for follow up of hypertension.  She does check blood pressure  regularly outside of the clinic. Outpatient blood pressures have not been over 140/90. She does not follow a low salt diet.     She eats 2-3 servings of fruits and vegetables daily.She consumes 0 sweetened beverage(s) daily.She exercises with enough effort to increase her heart rate 20 to 29 minutes per day.  She exercises with enough effort to increase her heart rate 5 days per week.   She is taking medications regularly.        Current Outpatient Medications   Medication Sig Dispense Refill    amLODIPine (NORVASC) 2.5 MG tablet Take 1 tablet (2.5 mg) by mouth daily. 90 tablet 3    atorvastatin (LIPITOR) 10 MG tablet TAKE 1 TABLET EVERY DAY 90 tablet 3    guaiFENesin-codeine (ROBITUSSIN AC) 100-10 MG/5ML solution Take 5-10 mLs by mouth 2 times daily as needed for cough. 118 mL 1    ketoconazole (NIZORAL) 2 % external shampoo Apply topically daily as needed for irritation or itching      latanoprost (XALATAN) 0.005 % ophthalmic solution instill 1 " drop into each eye at bedtime      levothyroxine (SYNTHROID/LEVOTHROID) 88 MCG tablet TAKE 1 TABLET EVERY DAY 90 tablet 2    neomycin-polymyxin-dexamethasone (MAXITROL) 3.5-84812-6.1 ophthalmic ointment Place 0.5 inches into both eyes as needed      valsartan (DIOVAN) 80 MG tablet TAKE 1 TABLET EVERY DAY 90 tablet 3     No current facility-administered medications for this visit.     Past Medical History:   Diagnosis Date    Cancer (H) 2019    Had melanoma & had surgery  on 2119    Hyperlipidemia LDL goal <100     Hypertension 2015    Taking Valsartan 80 Mg 1 daily    Thyroid disease     Taking Levothyroxine 88 mg 1 day in morning     Social History     Socioeconomic History    Marital status:      Spouse name: Not on file    Number of children: Not on file    Years of education: Not on file    Highest education level: Not on file   Occupational History    Not on file   Tobacco Use    Smoking status: Former     Current packs/day: 0.00     Average packs/day: 0.5 packs/day for 2.4 years (1.2 ttl pk-yrs)     Types: Cigarettes     Start date: 1961     Quit date: 1963     Years since quittin.0    Smokeless tobacco: Never   Vaping Use    Vaping status: Never Used   Substance and Sexual Activity    Alcohol use: Yes     Comment: 2 glasses of most days    Drug use: Never    Sexual activity: Not Currently     Partners: Male     Birth control/protection: None   Other Topics Concern    Parent/sibling w/ CABG, MI or angioplasty before 65F 55M? No   Social History Narrative    Not on file     Social Drivers of Health     Financial Resource Strain: Low Risk  (2024)    Financial Resource Strain     Within the past 12 months, have you or your family members you live with been unable to get utilities (heat, electricity) when it was really needed?: No   Food Insecurity: Low Risk  (2024)    Food Insecurity     Within the past 12 months, did you worry that your food would run out  before you got money to buy more?: No     Within the past 12 months, did the food you bought just not last and you didn t have money to get more?: No   Transportation Needs: Low Risk  (11/8/2024)    Transportation Needs     Within the past 12 months, has lack of transportation kept you from medical appointments, getting your medicines, non-medical meetings or appointments, work, or from getting things that you need?: No   Physical Activity: Sufficiently Active (11/8/2024)    Exercise Vital Sign     Days of Exercise per Week: 6 days     Minutes of Exercise per Session: 30 min   Stress: No Stress Concern Present (11/8/2024)    [employer] [place] [employer] - Occupational Stress Questionnaire     Feeling of Stress : Only a little   Social Connections: Unknown (11/8/2024)    Social Connection and Isolation Panel [NHANES]     Frequency of Communication with Friends and Family: Not on file     Frequency of Social Gatherings with Friends and Family: Twice a week     Attends Confucianist Services: Not on file     Active Member of Clubs or Organizations: Not on file     Attends Club or Organization Meetings: Not on file     Marital Status: Not on file   Interpersonal Safety: Low Risk  (12/4/2024)    Interpersonal Safety     Do you feel physically and emotionally safe where you currently live?: Yes     Within the past 12 months, have you been hit, slapped, kicked or otherwise physically hurt by someone?: No     Within the past 12 months, have you been humiliated or emotionally abused in other ways by your partner or ex-partner?: No   Housing Stability: Low Risk  (11/8/2024)    Housing Stability     Do you have housing? : Yes     Are you worried about losing your housing?: No             Review of Systems  Constitutional, HEENT, cardiovascular, pulmonary, GI, , musculoskeletal, neuro, skin, endocrine and psych systems are negative, except as otherwise noted.      Objective    /73 (BP Location: Right arm,  "Patient Position: Sitting, Cuff Size: Adult Regular)   Pulse 69   Temp 97.4  F (36.3  C) (Temporal)   Resp 20   Ht 1.626 m (5' 4\")   Wt 70.8 kg (156 lb)   LMP  (LMP Unknown)   SpO2 98%   BMI 26.78 kg/m    Body mass index is 26.78 kg/m .  Physical Exam   GENERAL: alert and no distress  EYES: Eyes grossly normal to inspection, conjunctivae and sclerae normal  HENT: ear canals and TM's normal, nose and mouth without ulcers or lesions  NECK: no asymmetry, masses, or scars  RESP: lungs clear to auscultation - no rales, rhonchi or wheezes  CV: regular rate and rhythm, normal S1 S2  ABDOMEN: soft, nontender, bowel sounds normal  MS: no gross musculoskeletal defects noted, no edema  SKIN: no suspicious lesions or rashes  NEURO: Normal strength and tone, mentation intact and speech normal  PSYCH: mentation appears normal, affect normal/bright    Labs reviewed in Epic    The longitudinal plan of care for the diagnosis(es)/condition(s) as documented were addressed during this visit. Due to the added complexity in care, I will continue to support Marah in the subsequent management and with ongoing continuity of care.          Signed Electronically by: Arelis High MD    "

## 2025-05-04 RX ORDER — ATORVASTATIN CALCIUM 10 MG/1
10 TABLET, FILM COATED ORAL DAILY
Qty: 90 TABLET | Refills: 3 | Status: SHIPPED | OUTPATIENT
Start: 2025-05-04

## 2025-07-29 DIAGNOSIS — E78.5 HYPERLIPIDEMIA LDL GOAL <100: ICD-10-CM

## 2025-07-30 RX ORDER — ATORVASTATIN CALCIUM 10 MG/1
10 TABLET, FILM COATED ORAL DAILY
Qty: 90 TABLET | Refills: 1 | Status: SHIPPED | OUTPATIENT
Start: 2025-07-30

## 2025-08-25 ENCOUNTER — HOSPITAL ENCOUNTER (OUTPATIENT)
Dept: MAMMOGRAPHY | Facility: CLINIC | Age: 85
Discharge: HOME | End: 2025-08-25
Attending: INTERNAL MEDICINE | Admitting: INTERNAL MEDICINE
Payer: MEDICARE

## 2025-08-25 DIAGNOSIS — Z12.31 VISIT FOR SCREENING MAMMOGRAM: ICD-10-CM

## 2025-08-25 PROCEDURE — 77063 BREAST TOMOSYNTHESIS BI: CPT

## (undated) DEVICE — SYR 10ML FINGER CONTROL W/O NDL 309695

## (undated) DEVICE — SUCTION CANISTER MEDIVAC LINER 3000ML W/LID 65651-530

## (undated) DEVICE — PREP CHLORAPREP 26ML TINTED HI-LITE ORANGE 930815

## (undated) DEVICE — DAVINCI XI SEAL UNIVERSAL 5-8MM 470361

## (undated) DEVICE — SU VICRYL 3-0 SH 27" J316H

## (undated) DEVICE — DAVINCI XI MONOPOLAR SCISSORS HOT SHEARS 8MM 470179

## (undated) DEVICE — DAVINCI XI DRAPE COLUMN 470341

## (undated) DEVICE — DECANTER BAG 2002S

## (undated) DEVICE — LINEN TOWEL PACK X5 5464

## (undated) DEVICE — SU STRATAFIX PDS PLUS CT-1 30CM SXPP1A435

## (undated) DEVICE — GOWN IMPERVIOUS SPECIALTY XLG/XLONG 32474

## (undated) DEVICE — DAVINCI XI DRAPE ARM 470015

## (undated) DEVICE — TUBING SUCTION 12"X1/4" N612

## (undated) DEVICE — ESU GROUND PAD UNIVERSAL W/O CORD

## (undated) DEVICE — SOL WATER IRRIG 1000ML BOTTLE 2F7114

## (undated) DEVICE — DEVICE SUTURE GRASPER TROCAR CLOSURE 14GA PMITCSG

## (undated) DEVICE — PACK LAP CHOLE SLC15LCFSD

## (undated) DEVICE — GLOVE BIOGEL PI MICRO SZ 7.5 48575

## (undated) DEVICE — DAVINCI XI OBTURATOR BLADELESS 8MM 470359

## (undated) DEVICE — EVAC SYSTEM CLEAR FLOW SC082500

## (undated) DEVICE — SU VICRYL 4-0 PS-2 18" UND J496H

## (undated) DEVICE — SYSTEM LAPAROVUE VISIBILITY LAPVUE10

## (undated) DEVICE — GLOVE BIOGEL PI MICRO INDICATOR UNDERGLOVE SZ 7.5 48975

## (undated) DEVICE — ENDO TROCAR FIRST ENTRY KII FIOS Z-THRD 05X100MM CTF03

## (undated) DEVICE — SU STRATAFIX PDS PLUS 2-0 SPIRAL SH 23CM SXPP1B433

## (undated) DEVICE — DRAPE SHEET REV FOLD 3/4 9349

## (undated) DEVICE — LIGHT HANDLE X2

## (undated) DEVICE — LUBRICANT INST ELECTROLUBE EL101

## (undated) DEVICE — DAVINCI HOT SHEARS TIP COVER  400180

## (undated) RX ORDER — FENTANYL CITRATE 0.05 MG/ML
INJECTION, SOLUTION INTRAMUSCULAR; INTRAVENOUS
Status: DISPENSED
Start: 2023-09-22

## (undated) RX ORDER — BUPIVACAINE HYDROCHLORIDE 5 MG/ML
INJECTION, SOLUTION EPIDURAL; INTRACAUDAL
Status: DISPENSED
Start: 2023-09-22

## (undated) RX ORDER — FENTANYL CITRATE 50 UG/ML
INJECTION, SOLUTION INTRAMUSCULAR; INTRAVENOUS
Status: DISPENSED
Start: 2023-09-22

## (undated) RX ORDER — PROPOFOL 10 MG/ML
INJECTION, EMULSION INTRAVENOUS
Status: DISPENSED
Start: 2023-09-22

## (undated) RX ORDER — APREPITANT 40 MG/1
CAPSULE ORAL
Status: DISPENSED
Start: 2023-09-22

## (undated) RX ORDER — OXYCODONE HYDROCHLORIDE 5 MG/1
TABLET ORAL
Status: DISPENSED
Start: 2023-09-22

## (undated) RX ORDER — DEXAMETHASONE SODIUM PHOSPHATE 4 MG/ML
INJECTION, SOLUTION INTRA-ARTICULAR; INTRALESIONAL; INTRAMUSCULAR; INTRAVENOUS; SOFT TISSUE
Status: DISPENSED
Start: 2023-09-22

## (undated) RX ORDER — ONDANSETRON 2 MG/ML
INJECTION INTRAMUSCULAR; INTRAVENOUS
Status: DISPENSED
Start: 2023-09-22